# Patient Record
Sex: FEMALE | Race: WHITE | NOT HISPANIC OR LATINO | Employment: UNEMPLOYED | URBAN - NONMETROPOLITAN AREA
[De-identification: names, ages, dates, MRNs, and addresses within clinical notes are randomized per-mention and may not be internally consistent; named-entity substitution may affect disease eponyms.]

---

## 2020-12-24 LAB
BACTERIA SPEC AEROBE CULT: NO GROWTH
EXTERNAL ABO GROUPING: NORMAL
EXTERNAL ANTIBODY SCREEN: NORMAL
EXTERNAL HEMATOCRIT: 36 %
EXTERNAL HEMOGLOBIN: 12.2 G/DL
EXTERNAL HEPATITIS B SURFACE ANTIGEN: NEGATIVE
EXTERNAL PLATELET COUNT: 213 K/ΜL
EXTERNAL RH FACTOR: POSITIVE
EXTERNAL SYPHILIS RPR SCREEN: NORMAL
EXTERNAL THYROID STIMULATING HORMONE: 0.83 M[IU]/ML
HCV AB S/CO SERPL IA: NEGATIVE
HIV 1+2 AB+HIV1 P24 AG SERPL QL IA: NEGATIVE
RUBV IGG SERPL IA-ACNC: NEGATIVE

## 2021-04-09 ENCOUNTER — INITIAL PRENATAL (OUTPATIENT)
Dept: OBSTETRICS AND GYNECOLOGY | Facility: CLINIC | Age: 26
End: 2021-04-09

## 2021-04-09 VITALS — DIASTOLIC BLOOD PRESSURE: 72 MMHG | SYSTOLIC BLOOD PRESSURE: 120 MMHG | WEIGHT: 163 LBS

## 2021-04-09 DIAGNOSIS — Z34.92 PRENATAL CARE IN SECOND TRIMESTER: Primary | ICD-10-CM

## 2021-04-09 DIAGNOSIS — Z36.89 SCREENING, ANTENATAL, FOR FETAL ANATOMIC SURVEY: ICD-10-CM

## 2021-04-09 PROCEDURE — 0501F PRENATAL FLOW SHEET: CPT | Performed by: OBSTETRICS & GYNECOLOGY

## 2021-04-09 RX ORDER — PRENATAL VIT/IRON FUM/FOLIC AC 27MG-0.8MG
TABLET ORAL DAILY
COMMUNITY

## 2021-04-15 NOTE — PROGRESS NOTES
New Pregnancy Visit    Subjective   Chief Complaint   Patient presents with   • Initial Prenatal Visit     ALCIRA, JUSTIN 2021, last pap 2020 WNL. Patient states she is doing well       Veronika Hall is a 25 y.o. year old .  Patient's last menstrual period was 10/20/2020 (lmp unknown).  She presents to initiate prenatal care with our group today.     First pregnancy.   is in the .  She is receiving prenatal care in [place].  Uncomplicated pregnancy thus far.  JUSTIN set by her LMP - 10/20/2020.    Social History    Tobacco Use      Smoking status: Never Smoker      Smokeless tobacco: Never Used      Current Outpatient Medications on File Prior to Visit   Medication Sig Dispense Refill   • Prenatal Vit-Fe Fumarate-FA (prenatal vitamin 27-0.8) 27-0.8 MG tablet tablet Take  by mouth Daily.       No current facility-administered medications on file prior to visit.          Objective   /72   Wt 73.9 kg (163 lb)   LMP 10/20/2020 (LMP Unknown)   Physical Exam:  Normal, gestational age-appropriate exam today        Medical Decision Making:    Lab Review:   All prenatal care records + labs    Note Review:  None    Imaging Review:  Pelvic ultrasound report   IUP at 24+3 weeks. Anatomy was reviewed today and normal except for borderline bilateral urinary tract dilation measuring 4 mm. EFW 724g. Cephalic presentation. Placenta is posterior and low-lying measuring 1 cm from the internal cervical os. Amniotic fluid and fetal heart rate are normal.  Assessment   1. IUP at 24+3 weeks  2. Supervision of low risk pregnancy   3. Transfer of care     Plan    1. The problem list for pregnancy was initiated today  2. Tests/Orders/Rx for today:  Orders Placed This Encounter   Procedures   • Urine Culture - Urine,     This is an external result entered through the Results Console.     Order Specific Question:   Release to patient     Answer:   Immediate   • Chlamydia trachomatis, Neisseria gonorrhoeae, PCR w/  confirmation - Swab, Vagina     Order Specific Question:   Release to patient     Answer:   Immediate   • US Ob 14 + Weeks Single or First Gestation     Order Specific Question:   Reason for Exam:     Answer:   anatomy   • HIV-1 / O / 2 Ag / Antibody 4th Generation     This is an external result entered through the Results Console.     Order Specific Question:   Release to patient     Answer:   Immediate   • Obstetric Panel     This is an external result entered through the Results Console.     Order Specific Question:   Release to patient     Answer:   Immediate   • Hepatitis C Antibody     This is an external result entered through the Results Console.     Order Specific Question:   Release to patient     Answer:   Immediate   • TSH     This is an external result entered through the Results Console.     Order Specific Question:   Release to patient     Answer:   Immediate   • OB Panel With HIV     Order Specific Question:   Release to patient     Answer:   Immediate       Medication Management: none    3. Testing for GC / Chlamydia / trichomonas was recently done and will not need to be repeated  4. Genetic testing: none  5. Information reviewed: exercise in pregnancy, nutrition in pregnancy, weight gain in pregnancy, work and travel restrictions during pregnancy, list of OTC medications acceptable in pregnancy and call coverage groups    Follow up: 2 week(s) for Glucola testing.    Gabriel Flores MD  Obstetrics and Gynecology  [facility]

## 2021-04-26 ENCOUNTER — ROUTINE PRENATAL (OUTPATIENT)
Dept: OBSTETRICS AND GYNECOLOGY | Facility: CLINIC | Age: 26
End: 2021-04-26

## 2021-04-26 VITALS — WEIGHT: 170 LBS | SYSTOLIC BLOOD PRESSURE: 122 MMHG | DIASTOLIC BLOOD PRESSURE: 72 MMHG

## 2021-04-26 DIAGNOSIS — Z3A.26 26 WEEKS GESTATION OF PREGNANCY: Primary | ICD-10-CM

## 2021-04-26 LAB — GLUCOSE 1H P 50 G GLC PO SERPL-MCNC: 78 MG/DL (ref 65–139)

## 2021-04-26 PROCEDURE — 0502F SUBSEQUENT PRENATAL CARE: CPT | Performed by: NURSE PRACTITIONER

## 2021-04-26 NOTE — PROGRESS NOTES
[id number]  Chief Complaint   Patient presents with   • Routine Prenatal Visit     Patient c/o lower back pain        HPI  Veronika is a  currently at 26w6d who today reports the following:   Hx of hip and low back pain was seeing chiropractor   Good FM         EXAM  /72   Wt 77.1 kg (170 lb)   LMP 10/20/2020 (Exact Date)  -See Prenatal Assessment  General Appearance:  Pleasant  Lungs: Breathing unlabored  Abdomen:  See flow sheet for Fundal ht, FM, FHT's  LE: Neg edema  V/E: Not performed     Social History     Tobacco Use   • Smoking status: Never Smoker   • Smokeless tobacco: Never Used   Vaping Use   • Vaping Use: Never used   Substance Use Topics   • Alcohol use: Never   • Drug use: Never         Lab Results   Component Value Date    ABO A 2020    RH Positive 2020    ABSCRN Normal 2020       MDM  Impression: Supervision of normal pregnancy   Low back / hip discomfort    Tests done today: 1 hr. glucola & CBC   Topics discussed: S/S labor and adeq FM/Kick Counts  Comfort measures /Maternity girdle    Nutrition reviewed   s/s PTL  encouraged questions - call prn   Written info optional/provided:  -COVID vaccine in pregnancy  -T-DAP   Tests next visit: none

## 2021-04-27 LAB
ABO GROUP BLD: ABNORMAL
BASOPHILS # BLD AUTO: 0 X10E3/UL (ref 0–0.2)
BASOPHILS NFR BLD AUTO: 0 %
BLD GP AB SCN SERPL QL: NEGATIVE
EOSINOPHIL # BLD AUTO: 0.1 X10E3/UL (ref 0–0.4)
EOSINOPHIL NFR BLD AUTO: 1 %
ERYTHROCYTE [DISTWIDTH] IN BLOOD BY AUTOMATED COUNT: 12.2 % (ref 11.7–15.4)
HBV SURFACE AG SERPL QL IA: NEGATIVE
HCT VFR BLD AUTO: 30.6 % (ref 34–46.6)
HCV AB S/CO SERPL IA: <0.1 S/CO RATIO (ref 0–0.9)
HGB BLD-MCNC: 10.2 G/DL (ref 11.1–15.9)
HIV 1+2 AB+HIV1 P24 AG SERPL QL IA: NON REACTIVE
IMM GRANULOCYTES # BLD AUTO: 0 X10E3/UL (ref 0–0.1)
IMM GRANULOCYTES NFR BLD AUTO: 1 %
LYMPHOCYTES # BLD AUTO: 1.3 X10E3/UL (ref 0.7–3.1)
LYMPHOCYTES NFR BLD AUTO: 25 %
MCH RBC QN AUTO: 31 PG (ref 26.6–33)
MCHC RBC AUTO-ENTMCNC: 33.3 G/DL (ref 31.5–35.7)
MCV RBC AUTO: 93 FL (ref 79–97)
MONOCYTES # BLD AUTO: 0.4 X10E3/UL (ref 0.1–0.9)
MONOCYTES NFR BLD AUTO: 7 %
NEUTROPHILS # BLD AUTO: 3.5 X10E3/UL (ref 1.4–7)
NEUTROPHILS NFR BLD AUTO: 66 %
PLATELET # BLD AUTO: 196 X10E3/UL (ref 150–450)
RBC # BLD AUTO: 3.29 X10E6/UL (ref 3.77–5.28)
RH BLD: POSITIVE
RPR SER QL: NON REACTIVE
RUBV IGG SERPL IA-ACNC: <0.9 INDEX
WBC # BLD AUTO: 5.3 X10E3/UL (ref 3.4–10.8)

## 2021-04-27 RX ORDER — FERROUS SULFATE 325(65) MG
325 TABLET ORAL
Qty: 60 TABLET | Refills: 2 | Status: SHIPPED | OUTPATIENT
Start: 2021-04-27 | End: 2021-04-28 | Stop reason: SDUPTHER

## 2021-04-28 RX ORDER — FERROUS SULFATE 325(65) MG
325 TABLET ORAL
Qty: 60 TABLET | Refills: 2 | Status: SHIPPED | OUTPATIENT
Start: 2021-04-28 | End: 2021-09-10

## 2021-05-10 ENCOUNTER — TELEPHONE (OUTPATIENT)
Dept: OBSTETRICS AND GYNECOLOGY | Facility: CLINIC | Age: 26
End: 2021-05-10

## 2021-05-10 DIAGNOSIS — F32.A DEPRESSION AFFECTING PREGNANCY: Primary | ICD-10-CM

## 2021-05-10 DIAGNOSIS — O99.340 DEPRESSION AFFECTING PREGNANCY: Primary | ICD-10-CM

## 2021-05-10 NOTE — TELEPHONE ENCOUNTER
"----- Message from Shyanne Tsai sent at 5/10/2021 10:33 AM EDT -----  Pt said she had been on Zoloft before getting pregnant. She had stopped it before conceiving but she said she is having some \"pretty bad episodes of anxiety/depression.     She is asking if she could be prescribed Zoloft again.    RX: [facility]    "

## 2021-05-14 ENCOUNTER — ROUTINE PRENATAL (OUTPATIENT)
Dept: OBSTETRICS AND GYNECOLOGY | Facility: CLINIC | Age: 26
End: 2021-05-14

## 2021-05-14 VITALS — SYSTOLIC BLOOD PRESSURE: 104 MMHG | WEIGHT: 168.2 LBS | DIASTOLIC BLOOD PRESSURE: 64 MMHG

## 2021-05-14 DIAGNOSIS — Z34.93 NORMAL PREGNANCY, THIRD TRIMESTER: Primary | ICD-10-CM

## 2021-05-14 PROCEDURE — 0502F SUBSEQUENT PRENATAL CARE: CPT | Performed by: NURSE PRACTITIONER

## 2021-05-14 RX ORDER — NITROFURANTOIN 25; 75 MG/1; MG/1
100 CAPSULE ORAL 2 TIMES DAILY
COMMUNITY
Start: 2021-05-09 | End: 2021-06-21

## 2021-05-14 NOTE — PROGRESS NOTES
[id number]  Chief Complaint   Patient presents with   • Routine Prenatal Visit     Patient is here for routine prenatal visit. She states that she is doing better on zoloft        HPI  Veronika is a  currently at 29w3d who today reports the following:    Good FM   Voiced concerns re: zoloft - though feels she needs it and it has helped   Taking iron BID   Finishing macrobid for UTI  Appetite is fine - has been very active / excise/ walking           EXAM  /64   Wt 76.3 kg (168 lb 3.2 oz)   LMP 10/20/2020 (Exact Date)  -See Prenatal Assessment  General Appearance:  Pleasant  Lungs: Breathing unlabored  Abdomen:  See flow sheet for Fundal ht, FM, FHT's  LE: Neg edema  V/E: Not performed     Social History     Tobacco Use   • Smoking status: Never Smoker   • Smokeless tobacco: Never Used   Vaping Use   • Vaping Use: Never used   Substance Use Topics   • Alcohol use: Never   • Drug use: Never         Lab Results   Component Value Date    ABO A 2021    RH Positive 2021    ABSCRN Negative 2021       MDM  Impression: Supervision of normal pregnancy   Resolving UTI  Panic attacks, anxiety, depression  anemia   Tests done today: none   Topics discussed: continue to note good FM  Finish antibiotics  Reassurance re: zoloft / Benefits vs Risks    Anemia / iron / foods high in iron  Nutrition reviewed   CB classes  encouraged questions - call prn    Tests next visit: none

## 2021-06-07 ENCOUNTER — ROUTINE PRENATAL (OUTPATIENT)
Dept: OBSTETRICS AND GYNECOLOGY | Facility: CLINIC | Age: 26
End: 2021-06-07

## 2021-06-07 VITALS — SYSTOLIC BLOOD PRESSURE: 108 MMHG | DIASTOLIC BLOOD PRESSURE: 64 MMHG | WEIGHT: 173 LBS

## 2021-06-07 DIAGNOSIS — Z36.89 ENCOUNTER FOR ULTRASOUND TO ASSESS FETAL GROWTH: Primary | ICD-10-CM

## 2021-06-07 PROCEDURE — 0502F SUBSEQUENT PRENATAL CARE: CPT | Performed by: OBSTETRICS & GYNECOLOGY

## 2021-06-07 NOTE — PROGRESS NOTES
Chief Complaint   Patient presents with   • Routine Prenatal Visit     has concerns about low placenta         HPI:   , 32w6d gestation reports doing well    ROS:  See Prenatal Episode/Flowsheet  /64   Wt 78.5 kg (173 lb)   LMP 10/20/2020 (Exact Date)      EXAM:  EXTREMITIES:  No swelling-See Prenatal Episode/Flowsheet    ABDOMEN:  FHTs/Movement noted-See Prenatal Episode/Flowsheet    URINE GLUCOSE/PROTEIN:  See Prenatal Episode/Flowsheet    PELVIC EXAM:  See Prenatal Episode/Flowsheet  CV:  Lungs:  GYN:    MDM:    Lab Results   Component Value Date    HGB 10.2 (L) 2021    RUBELLAABIGG <0.90 (L) 2021    HEPBSAG Negative 2021    ABO A 2021    RH Positive 2021    ABSCRN Negative 2021    [id number] Non Reactive 2021    HEPCVIRUSABY <0.1 2021    URINECX No growth 2020       U/S: Overall growth is 48.6 percentile.  Symmetric.  LUBA 16.34.  Vertex.  Posterior placenta.  Active fetus    1. IUP 32w6d  2. Routine care   3.  Anemia of pregnancy: Patient is taking her vitamins and iron.

## 2021-06-19 ENCOUNTER — HOSPITAL ENCOUNTER (OUTPATIENT)
Facility: HOSPITAL | Age: 26
End: 2021-06-19
Attending: OBSTETRICS & GYNECOLOGY | Admitting: OBSTETRICS & GYNECOLOGY

## 2021-06-19 ENCOUNTER — HOSPITAL ENCOUNTER (OUTPATIENT)
Facility: HOSPITAL | Age: 26
Discharge: HOME | End: 2021-06-19
Attending: OBSTETRICS & GYNECOLOGY | Admitting: OBSTETRICS & GYNECOLOGY

## 2021-06-19 VITALS
WEIGHT: 176.7 LBS | RESPIRATION RATE: 18 BRPM | DIASTOLIC BLOOD PRESSURE: 56 MMHG | OXYGEN SATURATION: 100 % | BODY MASS INDEX: 26.17 KG/M2 | SYSTOLIC BLOOD PRESSURE: 120 MMHG | HEIGHT: 69 IN | TEMPERATURE: 97.6 F | HEART RATE: 79 BPM

## 2021-06-19 LAB

## 2021-06-19 PROCEDURE — 81001 URINALYSIS AUTO W/SCOPE: CPT | Performed by: OBSTETRICS & GYNECOLOGY

## 2021-06-19 PROCEDURE — 59025 FETAL NON-STRESS TEST: CPT

## 2021-06-19 PROCEDURE — G0463 HOSPITAL OUTPT CLINIC VISIT: HCPCS

## 2021-06-19 PROCEDURE — 59025 FETAL NON-STRESS TEST: CPT | Performed by: OBSTETRICS & GYNECOLOGY

## 2021-06-19 PROCEDURE — 87086 URINE CULTURE/COLONY COUNT: CPT | Performed by: OBSTETRICS & GYNECOLOGY

## 2021-06-19 RX ORDER — CEPHALEXIN 500 MG/1
500 CAPSULE ORAL 4 TIMES DAILY
Qty: 28 CAPSULE | Refills: 0 | Status: SHIPPED | OUTPATIENT
Start: 2021-06-19 | End: 2021-06-21 | Stop reason: SDUPTHER

## 2021-06-19 RX ORDER — PHENAZOPYRIDINE HYDROCHLORIDE 100 MG/1
100 TABLET, FILM COATED ORAL 3 TIMES DAILY PRN
COMMUNITY
End: 2021-07-22

## 2021-06-19 RX ORDER — CEFPODOXIME PROXETIL 200 MG/1
200 TABLET, FILM COATED ORAL EVERY 12 HOURS
Status: ON HOLD | COMMUNITY
End: 2021-06-19

## 2021-06-19 NOTE — SIGNIFICANT NOTE
06/19/21 1352   Provider Notification   Reason for Communication Evaluate   Provider Name Dr. Dee Rodriguez   Notification Route Phone call   Response See orders  (d/c)

## 2021-06-19 NOTE — NON STRESS TEST
Triage Note - Nursing Documentation  Labor and Delivery Admission Log    Veronika Hall  : 1995  MRN: [id number]  CSN: [id number]    Date in / Time in:  2021  Time in: 1300    Date out / Time out:    Time out: 1413    Nurse: Monique Khan RN    Patient Info: She is a 25 y.o. year old  at 34w4d with an JUSTIN of 2021, by Last Menstrual Period who was seen on the [facility].    Chief Complaint:   Chief Complaint   Patient presents with   • Dysuria     HAS HAD ONGOING UTI SINCE        Provider Instructions / Disposition: Patient discharged home with take home fetal kick count and Pregnancy UTI S&S papers and instructions to  new antibiotic medications at pharmacy, drink plenty of water, and to keep follow up in office for Monday.    There is no problem list on file for this patient.      NST Documentation (Only applicable > 32 weeks): Interpretation A  Nonstress Test Interpretation A: Reactive (21 1330 : Monique Khan, RN)

## 2021-06-20 LAB — BACTERIA SPEC AEROBE CULT: NO GROWTH

## 2021-06-20 NOTE — NON STRESS TEST
Non Stress Test    [facility]    Patient: Veronika Hall  : 1995  MRN: [id number]  CSN: [id number]    Gestational Age: 34w5d    Indication for NST Pain with urination       Time On 13:08   Time Off 13:56       Interpretation    Baseline 's beats per minute   Category 1   Decelerations Absent       Additional Comments See nursing notes       Recommendations for f/u See nursing notes       This note has been electronically signed.    Dee Rodriguez M.D.

## 2021-06-21 ENCOUNTER — ROUTINE PRENATAL (OUTPATIENT)
Dept: OBSTETRICS AND GYNECOLOGY | Facility: CLINIC | Age: 26
End: 2021-06-21

## 2021-06-21 VITALS — WEIGHT: 178 LBS | BODY MASS INDEX: 26.29 KG/M2 | SYSTOLIC BLOOD PRESSURE: 108 MMHG | DIASTOLIC BLOOD PRESSURE: 60 MMHG

## 2021-06-21 DIAGNOSIS — I34.1 MITRAL VALVE PROLAPSE: ICD-10-CM

## 2021-06-21 DIAGNOSIS — Z34.93 PRENATAL CARE IN THIRD TRIMESTER: Primary | ICD-10-CM

## 2021-06-21 DIAGNOSIS — R00.2 PALPITATIONS: ICD-10-CM

## 2021-06-21 DIAGNOSIS — R06.00 DYSPNEA, UNSPECIFIED TYPE: ICD-10-CM

## 2021-06-21 PROCEDURE — 0502F SUBSEQUENT PRENATAL CARE: CPT | Performed by: OBSTETRICS & GYNECOLOGY

## 2021-06-21 RX ORDER — CEPHALEXIN 500 MG/1
500 CAPSULE ORAL NIGHTLY
Qty: 30 CAPSULE | Refills: 5 | Status: SHIPPED | OUTPATIENT
Start: 2021-06-21 | End: 2021-07-31 | Stop reason: HOSPADM

## 2021-06-21 NOTE — PROGRESS NOTES
Prenatal Care Visit    Subjective   Chief Complaint   Patient presents with   • Routine Prenatal Visit     No complaints       History:   Veronika is a  currently at 34w6d who presents for a prenatal care visit today.    Dyspnea, palpitations, history of MVP, Mother is concerned for Marfan's Syndrome    Social History    Tobacco Use      Smoking status: Never Smoker      Smokeless tobacco: Never Used       Objective   /60   Wt 80.7 kg (178 lb)   LMP 10/20/2020 (Exact Date)   BMI 26.29 kg/m²   Physical Exam:  Normal, gestational age-appropriate exam today        Plan   Medical Decision Making:    I have reviewed the prenatal labs and ultrasound(s) today. I have reviewed the most recent prenatal progress note(s).    Diagnosis: Supervision of high risk pregnancy   Mitral valve prolapse  Dyspnea  Palpitations   Tests/Orders/Rx today: Orders Placed This Encounter   Procedures   • Adult Transthoracic Echo Limited W/ Cont if Necessary Per Protocol     PATIENT IS PREGNANT     Standing Status:   Future     Standing Expiration Date:   2022     Order Specific Question:   Reason for exam?     Answer:   Dyspnea     Order Specific Question:   Reason for exam?     Answer:   Palpitations     Order Specific Question:   Reason for exam?     Answer:   Valvular Function     Order Specific Question:   Release to patient     Answer:   Immediate       Medication Management: None     Topics discussed: Prenatal care milestones  kick counts and fetal movement  PIH precautions   labor signs and symptoms   Echo ordered to assess chest symptoms/cardiac function   Tests next visit: none   Next visit: 1 week(s)     Gabriel Flores MD  Obstetrics and Gynecology  [facility]

## 2021-06-24 ENCOUNTER — HOSPITAL ENCOUNTER (OUTPATIENT)
Dept: CARDIOLOGY | Facility: HOSPITAL | Age: 26
Discharge: HOME | End: 2021-06-24
Admitting: OBSTETRICS & GYNECOLOGY

## 2021-06-24 DIAGNOSIS — R06.00 DYSPNEA, UNSPECIFIED TYPE: ICD-10-CM

## 2021-06-24 DIAGNOSIS — R00.2 PALPITATIONS: ICD-10-CM

## 2021-06-24 DIAGNOSIS — I34.1 MITRAL VALVE PROLAPSE: ICD-10-CM

## 2021-06-24 PROCEDURE — 93306 TTE W/DOPPLER COMPLETE: CPT | Performed by: INTERNAL MEDICINE

## 2021-06-24 PROCEDURE — 93306 TTE W/DOPPLER COMPLETE: CPT

## 2021-06-26 LAB
BH CV ECHO MEAS - % IVS THICK: 23.8 %
BH CV ECHO MEAS - % LVPW THICK: 71.6 %
BH CV ECHO MEAS - AO MAX PG (FULL): 3.4 MMHG
BH CV ECHO MEAS - AO MAX PG: 7 MMHG
BH CV ECHO MEAS - AO MEAN PG (FULL): 3 MMHG
BH CV ECHO MEAS - AO MEAN PG: 5 MMHG
BH CV ECHO MEAS - AO ROOT AREA (BSA CORRECTED): 1.6
BH CV ECHO MEAS - AO ROOT AREA: 7.3 CM^2
BH CV ECHO MEAS - AO ROOT DIAM: 3.1 CM
BH CV ECHO MEAS - AO V2 MAX: 136 CM/SEC
BH CV ECHO MEAS - AO V2 MEAN: 102 CM/SEC
BH CV ECHO MEAS - AO V2 VTI: 25.6 CM
BH CV ECHO MEAS - ASC AORTA: 3.1 CM
BH CV ECHO MEAS - AVA(I,A): 2.9 CM^2
BH CV ECHO MEAS - AVA(I,D): 2.9 CM^2
BH CV ECHO MEAS - AVA(V,A): 2.6 CM^2
BH CV ECHO MEAS - AVA(V,D): 2.6 CM^2
BH CV ECHO MEAS - BSA(HAYCOCK): 2 M^2
BH CV ECHO MEAS - BSA: 2 M^2
BH CV ECHO MEAS - BZI_BMI: 26.3 KILOGRAMS/M^2
BH CV ECHO MEAS - BZI_METRIC_HEIGHT: 175.3 CM
BH CV ECHO MEAS - BZI_METRIC_WEIGHT: 80.7 KG
BH CV ECHO MEAS - EDV(CUBED): 91.7 ML
BH CV ECHO MEAS - EDV(MOD-SP2): 123 ML
BH CV ECHO MEAS - EDV(MOD-SP4): 129 ML
BH CV ECHO MEAS - EDV(TEICH): 92.9 ML
BH CV ECHO MEAS - EF(CUBED): 67.8 %
BH CV ECHO MEAS - EF(MOD-BP): 58 %
BH CV ECHO MEAS - EF(MOD-SP2): 49.7 %
BH CV ECHO MEAS - EF(MOD-SP4): 49 %
BH CV ECHO MEAS - EF(TEICH): 59.5 %
BH CV ECHO MEAS - ESV(CUBED): 29.5 ML
BH CV ECHO MEAS - ESV(MOD-SP2): 61.9 ML
BH CV ECHO MEAS - ESV(MOD-SP4): 65.8 ML
BH CV ECHO MEAS - ESV(TEICH): 37.6 ML
BH CV ECHO MEAS - FS: 31.5 %
BH CV ECHO MEAS - IVS/LVPW: 1
BH CV ECHO MEAS - IVSD: 0.84 CM
BH CV ECHO MEAS - IVSS: 1 CM
BH CV ECHO MEAS - LA DIMENSION: 2.9 CM
BH CV ECHO MEAS - LA/AO: 0.94
BH CV ECHO MEAS - LAD MAJOR: 4.7 CM
BH CV ECHO MEAS - LAT PEAK E' VEL: 10.3 CM/SEC
BH CV ECHO MEAS - LATERAL E/E' RATIO: 7
BH CV ECHO MEAS - LV DIASTOLIC VOL/BSA (35-75): 65.6 ML/M^2
BH CV ECHO MEAS - LV MASS(C)D: 118.8 GRAMS
BH CV ECHO MEAS - LV MASS(C)DI: 60.4 GRAMS/M^2
BH CV ECHO MEAS - LV MASS(C)S: 116 GRAMS
BH CV ECHO MEAS - LV MASS(C)SI: 59 GRAMS/M^2
BH CV ECHO MEAS - LV MAX PG: 3.6 MMHG
BH CV ECHO MEAS - LV MEAN PG: 2 MMHG
BH CV ECHO MEAS - LV SYSTOLIC VOL/BSA (12-30): 33.5 ML/M^2
BH CV ECHO MEAS - LV V1 MAX: 94.8 CM/SEC
BH CV ECHO MEAS - LV V1 MEAN: 57.1 CM/SEC
BH CV ECHO MEAS - LV V1 VTI: 19.2 CM
BH CV ECHO MEAS - LVIDD: 4.5 CM
BH CV ECHO MEAS - LVIDS: 3.1 CM
BH CV ECHO MEAS - LVLD AP2: 9.3 CM
BH CV ECHO MEAS - LVLD AP4: 9.1 CM
BH CV ECHO MEAS - LVLS AP2: 7.4 CM
BH CV ECHO MEAS - LVLS AP4: 7.9 CM
BH CV ECHO MEAS - LVOT AREA (M): 3.8 CM^2
BH CV ECHO MEAS - LVOT AREA: 3.8 CM^2
BH CV ECHO MEAS - LVOT DIAM: 2.2 CM
BH CV ECHO MEAS - LVPWD: 0.81 CM
BH CV ECHO MEAS - LVPWS: 1.4 CM
BH CV ECHO MEAS - MED PEAK E' VEL: 10.3 CM/SEC
BH CV ECHO MEAS - MEDIAL E/E' RATIO: 7
BH CV ECHO MEAS - MV A MAX VEL: 56.3 CM/SEC
BH CV ECHO MEAS - MV DEC TIME: 0.25 SEC
BH CV ECHO MEAS - MV E MAX VEL: 72.3 CM/SEC
BH CV ECHO MEAS - MV E/A: 1.3
BH CV ECHO MEAS - MV MAX PG: 1.9 MMHG
BH CV ECHO MEAS - MV MEAN PG: 1 MMHG
BH CV ECHO MEAS - MV V2 MAX: 68.9 CM/SEC
BH CV ECHO MEAS - MV V2 MEAN: 46 CM/SEC
BH CV ECHO MEAS - MV V2 VTI: 17.6 CM
BH CV ECHO MEAS - MVA(VTI): 4.1 CM^2
BH CV ECHO MEAS - PA ACC TIME: 0.08 SEC
BH CV ECHO MEAS - PA MAX PG (FULL): 3.9 MMHG
BH CV ECHO MEAS - PA MAX PG: 7.2 MMHG
BH CV ECHO MEAS - PA MEAN PG (FULL): 1 MMHG
BH CV ECHO MEAS - PA MEAN PG: 3 MMHG
BH CV ECHO MEAS - PA PR(ACCEL): 42.6 MMHG
BH CV ECHO MEAS - PA V2 MAX: 134 CM/SEC
BH CV ECHO MEAS - PA V2 MEAN: 82.3 CM/SEC
BH CV ECHO MEAS - PA V2 VTI: 21 CM
BH CV ECHO MEAS - PI END-D VEL: 103 CM/SEC
BH CV ECHO MEAS - RV MAX PG: 3.3 MMHG
BH CV ECHO MEAS - RV MEAN PG: 2 MMHG
BH CV ECHO MEAS - RV V1 MAX: 90.2 CM/SEC
BH CV ECHO MEAS - RV V1 MEAN: 60.4 CM/SEC
BH CV ECHO MEAS - RV V1 VTI: 16.1 CM
BH CV ECHO MEAS - SI(AO): 95.1 ML/M^2
BH CV ECHO MEAS - SI(CUBED): 31.7 ML/M^2
BH CV ECHO MEAS - SI(LVOT): 37.1 ML/M^2
BH CV ECHO MEAS - SI(MOD-SP2): 31.1 ML/M^2
BH CV ECHO MEAS - SI(MOD-SP4): 32.1 ML/M^2
BH CV ECHO MEAS - SI(TEICH): 28.1 ML/M^2
BH CV ECHO MEAS - SV(AO): 187 ML
BH CV ECHO MEAS - SV(CUBED): 62.2 ML
BH CV ECHO MEAS - SV(LVOT): 73 ML
BH CV ECHO MEAS - SV(MOD-SP2): 61.1 ML
BH CV ECHO MEAS - SV(MOD-SP4): 63.2 ML
BH CV ECHO MEAS - SV(TEICH): 55.3 ML
BH CV ECHO MEAS - TAPSE (>1.6): 3.4 CM
BH CV ECHO MEASUREMENTS AVERAGE E/E' RATIO: 7.02
BH CV XLRA - RV BASE: 4.7 CM
BH CV XLRA - RV LENGTH: 8.6 CM
BH CV XLRA - RV MID: 4.3 CM
BH CV XLRA - TDI S': 20.4 CM/SEC
LEFT ATRIUM VOLUME INDEX: 18 ML/M^2
LEFT ATRIUM VOLUME: 35.4 ML
LV EF 2D ECHO EST: 58 %
MAXIMAL PREDICTED HEART RATE: 194 BPM
STRESS TARGET HR: 165 BPM

## 2021-06-28 ENCOUNTER — ROUTINE PRENATAL (OUTPATIENT)
Dept: OBSTETRICS AND GYNECOLOGY | Facility: CLINIC | Age: 26
End: 2021-06-28

## 2021-06-28 VITALS — WEIGHT: 179 LBS | BODY MASS INDEX: 26.43 KG/M2 | SYSTOLIC BLOOD PRESSURE: 126 MMHG | DIASTOLIC BLOOD PRESSURE: 70 MMHG

## 2021-06-28 DIAGNOSIS — F32.A DEPRESSION AFFECTING PREGNANCY: ICD-10-CM

## 2021-06-28 DIAGNOSIS — R00.2 PALPITATIONS: ICD-10-CM

## 2021-06-28 DIAGNOSIS — O99.340 DEPRESSION AFFECTING PREGNANCY: ICD-10-CM

## 2021-06-28 DIAGNOSIS — R06.00 DYSPNEA, UNSPECIFIED TYPE: ICD-10-CM

## 2021-06-28 DIAGNOSIS — Z34.03 ENCOUNTER FOR SUPERVISION OF NORMAL FIRST PREGNANCY IN THIRD TRIMESTER: Primary | ICD-10-CM

## 2021-06-28 PROCEDURE — 0502F SUBSEQUENT PRENATAL CARE: CPT | Performed by: OBSTETRICS & GYNECOLOGY

## 2021-06-28 NOTE — PROGRESS NOTES
Chief Complaint  Routine Prenatal Visit (No complaints)    History of Present Illness:  Veronika is a  currently at 35w6d who presents today with no complaints.  Patient does report good fetal movement.  Patient did have echocardiogram as noted.  Patient reports that history of mitral valve prolapse.  Patient has been having dyspnea and palpitations.    Exam:  Vitals:  See prenatal flowsheet as noted and reviewed  General: Alert, cooperative, and does not appear in any distress  Abdomen:   See prenatal flowsheet as noted and reviewed    Uterus gravid, non-tender; no palpable masses    No guarding or rebound tenderness  Pelvic:  See prenatal flowsheet as noted and reviewed  Ext:  See prenatal flowsheet as noted and reviewed    Moves extremities well, no cyanosis and no redness  Urine:  See prenatal flowsheet as noted and reviewed    Data Review:  The following data was reviewed by: Dee Rodriguez MD on 2021:  Prenatal Labs:  Lab Results   Component Value Date    HGB 10.2 (L) 2021    RUBELLAABIGG <0.90 (L) 2021    HEPBSAG Negative 2021    ABO A 2021    RH Positive 2021    ABSCRN Negative 2021    OGP3JYH6 Non Reactive 2021    HEPCVIRUSABY <0.1 2021    URINECX No growth 2021       Hospital Outpatient Visit on 2021   Component Date Value   • BSA 2021 2.0    • IVSd 2021 0.84    • IVSs 2021 1.0    • LVIDd 2021 4.5    • LVIDs 2021 3.1    • LVPWd 2021 0.81    • BH CV ECHO MELISSA - LVPWS 2021 1.4    • IVS/LVPW 2021 1.0    • FS 2021 31.5    • EDV(Teich) 2021 92.9    • ESV(Teich) 2021 37.6    • EF(Teich) 2021 59.5    • EDV(cubed) 2021 91.7    • ESV(cubed) 2021 29.5    • EF(cubed) 2021 67.8    • % IVS thick 2021 23.8    • % LVPW thick 2021 71.6    • LV mass(C)d 2021 118.8    • LV mass(C)dI 2021 60.4    • LV mass(C)s 2021 116.0    • LV mass(C)sI  06/24/2021 59.0    • SV(Teich) 06/24/2021 55.3    • SI(Teich) 06/24/2021 28.1    • SV(cubed) 06/24/2021 62.2    • SI(cubed) 06/24/2021 31.7    • Ao root diam 06/24/2021 3.1    • Ao root area 06/24/2021 7.3    • LA dimension 06/24/2021 2.9    • asc Aorta Diam 06/24/2021 3.1    • LA/Ao 06/24/2021 0.94    • LVOT diam 06/24/2021 2.2    • LVOT area 06/24/2021 3.8    • LVOT area(traced) 06/24/2021 3.8    • LAd major 06/24/2021 4.7    • LVLd ap4 06/24/2021 9.1    • EDV(MOD-sp4) 06/24/2021 129.0    • LVLs ap4 06/24/2021 7.9    • ESV(MOD-sp4) 06/24/2021 65.8    • EF(MOD-sp4) 06/24/2021 49.0    • LVLd ap2 06/24/2021 9.3    • EDV(MOD-sp2) 06/24/2021 123.0    • LVLs ap2 06/24/2021 7.4    • ESV(MOD-sp2) 06/24/2021 61.9    • EF(MOD-sp2) 06/24/2021 49.7    • LA volume 06/24/2021 35.4    • EF(MOD-bp) 06/24/2021 58    • SV(MOD-sp4) 06/24/2021 63.2    • SI(MOD-sp4) 06/24/2021 32.1    • SV(MOD-sp2) 06/24/2021 61.1    • SI(MOD-sp2) 06/24/2021 31.1    • Ao root area (BSA correc* 06/24/2021 1.6    • LV Hatch Vol (BSA correct* 06/24/2021 65.6    • LV Sys Vol (BSA correcte* 06/24/2021 33.5    • TAPSE (>1.6) 06/24/2021 3.4    • LA Volume Index 06/24/2021 18.0    • MV E max delmer 06/24/2021 72.3    • MV A max delmer 06/24/2021 56.3    • MV E/A 06/24/2021 1.3    • MV V2 max 06/24/2021 68.9    • MV max PG 06/24/2021 1.9    • MV V2 mean 06/24/2021 46.0    • MV mean PG 06/24/2021 1.0    • MV V2 VTI 06/24/2021 17.6    • MVA(VTI) 06/24/2021 4.1    • MV dec time 06/24/2021 0.25    • Ao pk delmer 06/24/2021 136.0    • Ao max PG 06/24/2021 7.0    • Ao max PG (full) 06/24/2021 3.4    • Ao V2 mean 06/24/2021 102.0    • Ao mean PG 06/24/2021 5.0    • Ao mean PG (full) 06/24/2021 3.0    • Ao V2 VTI 06/24/2021 25.6    • DANUTA(I,A) 06/24/2021 2.9    • DANUTA(I,D) 06/24/2021 2.9    • DANUTA(V,A) 06/24/2021 2.6    • DANUTA(V,D) 06/24/2021 2.6    • LV V1 max PG 06/24/2021 3.6    • LV V1 mean PG 06/24/2021 2.0    • LV V1 max 06/24/2021 94.8    • LV V1 mean 06/24/2021 57.1    • LV  V1 VTI 06/24/2021 19.2    • SV(Ao) 06/24/2021 187.0    • SI(Ao) 06/24/2021 95.1    • SV(LVOT) 06/24/2021 73.0    • SI(LVOT) 06/24/2021 37.1    • PA V2 max 06/24/2021 134.0    • PA max PG 06/24/2021 7.2    • PA max PG (full) 06/24/2021 3.9    • PA V2 mean 06/24/2021 82.3    • PA mean PG 06/24/2021 3.0    • PA mean PG (full) 06/24/2021 1.0    • PA V2 VTI 06/24/2021 21.0    • PA acc time 06/24/2021 0.08    • PI end-d anselmo 06/24/2021 103.0    • RV V1 max PG 06/24/2021 3.3    • RV V1 mean PG 06/24/2021 2.0    • RV V1 max 06/24/2021 90.2    • RV V1 mean 06/24/2021 60.4    • RV V1 VTI 06/24/2021 16.1    • PA pr(Accel) 06/24/2021 42.6    • RV Base 06/24/2021 4.7    • RV Length 06/24/2021 8.6    • RV Mid 06/24/2021 4.3    • RV S' 06/24/2021 20.4    • Lat E/e'  06/24/2021 7.0    • Med E/e' 06/24/2021 7.0    • Lat Peak E' Anselmo 06/24/2021 10.3    • Med Peak E' Anselmo 06/24/2021 10.3    •  CV ECHO MELISSA - BZI_BMI 06/24/2021 26.3    •  CV ECHO MELISSA - BSA(HA* 06/24/2021 2.0    •  CV ECHO MELISSA - BZI_ME* 06/24/2021 80.7    •  CV ECHO MELISSA - BZI_ME* 06/24/2021 175.3    • Avg E/e' ratio 06/24/2021 7.02    • Target HR (85%) 06/24/2021 165    • Max. Pred. HR (100%) 06/24/2021 194    • Echo EF Estimated 06/24/2021 58    Admission on 06/19/2021, Discharged on 06/19/2021   Component Date Value   • Color, UA 06/19/2021 Dark Yellow*   • Appearance, UA 06/19/2021 Clear    • pH, UA 06/19/2021 6.5    • Specific Gravity, UA 06/19/2021 1.009    • Glucose, UA 06/19/2021 Negative    • Ketones, UA 06/19/2021 Negative    • Bilirubin, UA 06/19/2021 Negative    • Blood, UA 06/19/2021 Negative    • Protein, UA 06/19/2021 Negative    • Leuk Esterase, UA 06/19/2021 Trace*   • Nitrite, UA 06/19/2021 Positive*   • Urobilinogen, UA 06/19/2021 1.0 E.U./dL    • RBC, UA 06/19/2021 0-2*   • WBC, UA 06/19/2021 13-20*   • Bacteria, UA 06/19/2021 3+*   • Squamous Epithelial Cell* 06/19/2021 0-2    • Hyaline Casts, UA 06/19/2021 None Seen    • Methodology  2021 Manual Light Microscopy    • Urine Culture 2021 No growth      Imaging:  Adult Transthoracic Echo Complete w/ Color, Spectral and Contrast if Necessary Per Protocol  · Estimated left ventricular EF = 58% Left ventricular ejection fraction   appears to be 56 - 60%. Left ventricular systolic function is normal.  · Left ventricular diastolic function was normal.       Medical Records:  None    Assessment and Plan:  Problem List Items Addressed This Visit     None      Visit Diagnoses     Encounter for supervision of normal first pregnancy in third trimester    -  Primary  Topics discussed:     kick counts and fetal movement  PIH precautions   labor signs and symptoms  GBS next visit    Palpitations      Patient informed regarding her echocardiogram findings.  Instructions and precautions are given.    Dyspnea, unspecified type      Patient has been informed regarding her echocardiogram findings.  Instructions and precautions have been given.    Depression affecting pregnancy            Follow Up/Instructions:    Patient was given instructions and counseling regarding her condition or for health maintenance advice. Please see specific information pulled into the AVS if appropriate.     Note: Speech recognition transcription software may have been used to dictate portions of this document.  An attempt at proofreading has been made though minor errors in transcription may still be present.    This note was electronically signed.  Dee Rodriguez M.D.

## 2021-07-06 ENCOUNTER — ROUTINE PRENATAL (OUTPATIENT)
Dept: OBSTETRICS AND GYNECOLOGY | Facility: CLINIC | Age: 26
End: 2021-07-06

## 2021-07-06 VITALS — DIASTOLIC BLOOD PRESSURE: 76 MMHG | BODY MASS INDEX: 26.29 KG/M2 | SYSTOLIC BLOOD PRESSURE: 124 MMHG | WEIGHT: 178 LBS

## 2021-07-06 DIAGNOSIS — Z34.93 PRENATAL CARE IN THIRD TRIMESTER: Primary | ICD-10-CM

## 2021-07-06 DIAGNOSIS — Z36.85 ANTENATAL SCREENING FOR STREPTOCOCCUS B: ICD-10-CM

## 2021-07-06 PROCEDURE — 59426 ANTEPARTUM CARE ONLY: CPT | Performed by: OBSTETRICS & GYNECOLOGY

## 2021-07-06 NOTE — PROGRESS NOTES
Prenatal Care Visit    Subjective   Chief Complaint   Patient presents with   • Routine Prenatal Visit     GBS done today, no complaints       History:   Veronika is a  currently at 37w0d who presents for a prenatal care visit today.    No issues.    Social History    Tobacco Use      Smoking status: Never Smoker      Smokeless tobacco: Never Used       Objective   /76   Wt 80.7 kg (178 lb)   LMP 10/20/2020 (Exact Date)   BMI 26.29 kg/m²   Physical Exam:  Normal, gestational age-appropriate exam today        Plan   Medical Decision Making:    I have reviewed the prenatal labs and ultrasound(s) today. I have reviewed the most recent prenatal progress note(s).    Diagnosis: Supervision of low risk pregnancy   Palpitations   Tests/Orders/Rx today: Orders Placed This Encounter   Procedures   • Strep Grp B SINAN + Reflex - Swab, Vaginal/Rectum     Order Specific Question:   Release to patient     Answer:   Immediate       Medication Management: None     Topics discussed: Prenatal care milestones  kick counts and fetal movement  labor signs and symptoms  PIH precautions    Tests next visit: none   Next visit: 1 week(s)     Gabriel Flores MD  Obstetrics and Gynecology  [facility]

## 2021-07-08 LAB — GP B STREP DNA SPEC QL NAA+PROBE: NEGATIVE

## 2021-07-12 ENCOUNTER — ROUTINE PRENATAL (OUTPATIENT)
Dept: OBSTETRICS AND GYNECOLOGY | Facility: CLINIC | Age: 26
End: 2021-07-12

## 2021-07-12 VITALS — SYSTOLIC BLOOD PRESSURE: 118 MMHG | DIASTOLIC BLOOD PRESSURE: 76 MMHG | BODY MASS INDEX: 26.43 KG/M2 | WEIGHT: 179 LBS

## 2021-07-12 DIAGNOSIS — Z34.03 ENCOUNTER FOR SUPERVISION OF NORMAL FIRST PREGNANCY IN THIRD TRIMESTER: Primary | ICD-10-CM

## 2021-07-12 PROCEDURE — 0502F SUBSEQUENT PRENATAL CARE: CPT | Performed by: MIDWIFE

## 2021-07-12 NOTE — PROGRESS NOTES
Chief Complaint   Patient presents with   • Routine Prenatal Visit     saturday constant contractions, nothing since       HPI: Veronika is a  currently at 37w6d who today reports the following:  Baby is active. She had a lot of contractions on 7/10 and would like her cervix checked today. She feels like the baby has dropped.                EXAM:     Vitals:    21 1123   BP: 118/76      Abdomen:   See prenatal flowsheet as noted and reviewed, soft, nontender   Pelvic:  Posterior and soft, FT/thick/ -2   Urine:  See prenatal flowsheet as noted and reviewed    Lab Results   Component Value Date    ABO A 2021    RH Positive 2021    ABSCRN Negative 2021       MDM:  Impression: Supervision of normal pregnancy  Anemia   Tests done today: none   Topics discussed: kick counts and fetal movement  labor signs and symptoms   Reviewed OB labs   Tests next visit: none                RTO:                        1 week    This note was electronically signed.  Ksenia Hager, TOM  2021

## 2021-07-19 ENCOUNTER — ROUTINE PRENATAL (OUTPATIENT)
Dept: OBSTETRICS AND GYNECOLOGY | Facility: CLINIC | Age: 26
End: 2021-07-19

## 2021-07-19 VITALS — WEIGHT: 179 LBS | DIASTOLIC BLOOD PRESSURE: 70 MMHG | BODY MASS INDEX: 26.43 KG/M2 | SYSTOLIC BLOOD PRESSURE: 116 MMHG

## 2021-07-19 DIAGNOSIS — Z34.93 NORMAL PREGNANCY, THIRD TRIMESTER: Primary | ICD-10-CM

## 2021-07-19 PROCEDURE — 0502F SUBSEQUENT PRENATAL CARE: CPT | Performed by: NURSE PRACTITIONER

## 2021-07-19 RX ORDER — SERTRALINE HYDROCHLORIDE 25 MG/1
25 TABLET, FILM COATED ORAL DAILY
COMMUNITY
End: 2021-07-31 | Stop reason: HOSPADM

## 2021-07-19 NOTE — PROGRESS NOTES
[id number]  Chief Complaint   Patient presents with   • Routine Prenatal Visit     Patient states she is doing well        HPI  Veronika is a  currently at 38w6d who today reports the following:   Good FM   Considering induction      EXAM  /70   Wt 81.2 kg (179 lb)   LMP 10/20/2020 (Exact Date)   BMI 26.43 kg/m²  -See Prenatal Assessment  General Appearance:  Pleasant  Lungs: Breathing unlabored  Abdomen:  See flow sheet for Fundal ht, FM, FHT's  LE: Neg edema  V/E: cx very post / FT     Social History     Tobacco Use   • Smoking status: Never Smoker   • Smokeless tobacco: Never Used   Vaping Use   • Vaping Use: Never used   Substance Use Topics   • Alcohol use: Never   • Drug use: Never         Lab Results   Component Value Date    ABO A 2021    RH Positive 2021    ABSCRN Negative 2021       MDM  Impression: Supervision of normal pregnancy   Tests done today: none   Topics discussed: S/S labor and adeq FM/Kick Counts  Discussed V/E / B&R of induction / options   Would like to think about induction & risa cx on Wed   encouraged questions - call prn    Tests next visit: none

## 2021-07-22 ENCOUNTER — ROUTINE PRENATAL (OUTPATIENT)
Dept: OBSTETRICS AND GYNECOLOGY | Facility: CLINIC | Age: 26
End: 2021-07-22

## 2021-07-22 VITALS — SYSTOLIC BLOOD PRESSURE: 118 MMHG | DIASTOLIC BLOOD PRESSURE: 80 MMHG | WEIGHT: 180 LBS | BODY MASS INDEX: 26.58 KG/M2

## 2021-07-22 DIAGNOSIS — Z34.03 ENCOUNTER FOR SUPERVISION OF NORMAL FIRST PREGNANCY IN THIRD TRIMESTER: Primary | ICD-10-CM

## 2021-07-22 PROCEDURE — 0502F SUBSEQUENT PRENATAL CARE: CPT | Performed by: MIDWIFE

## 2021-07-22 NOTE — PROGRESS NOTES
Chief Complaint   Patient presents with   • Routine Prenatal Visit     Patient states she is doing well       HPI: Veronika is a  currently at 39w2d who today reports the following:  Baby is active.  She denies any cramping or contractions.  She started her care in [place] and states her initial due date was 2021. She feels like her conception was on 2020 which is consistent with this due date.  She would prefer not to be induced until after her due date and then she would consider.                EXAM:     Vitals:    21 1052   BP: 118/80      Abdomen:   See prenatal flowsheet as noted and reviewed, soft, nontender   Pelvic:  See prenatal flowsheet as noted and reviewed, FT/50/-2 very posterior   Urine:  See prenatal flowsheet as noted and reviewed    Lab Results   Component Value Date    ABO A 2021    RH Positive 2021    ABSCRN Negative 2021       MDM:  Impression: Supervision of low risk pregnancy  Anemia in pregnancy   Tests done today: none   Topics discussed: kick counts and fetal movement  labor signs and symptoms  Reviewed OB labs   Tests next visit: BPP                RTO:                        1 weeks    This note was electronically signed.  Ksenia Hager, APRN  2021

## 2021-07-28 ENCOUNTER — PREP FOR SURGERY (OUTPATIENT)
Dept: OTHER | Facility: HOSPITAL | Age: 26
End: 2021-07-28

## 2021-07-28 ENCOUNTER — HOSPITAL ENCOUNTER (OUTPATIENT)
Facility: HOSPITAL | Age: 26
Discharge: HOME | End: 2021-07-28
Attending: NURSE PRACTITIONER | Admitting: NURSE PRACTITIONER

## 2021-07-28 ENCOUNTER — HOSPITAL ENCOUNTER (INPATIENT)
Facility: HOSPITAL | Age: 26
LOS: 3 days | Discharge: HOME | End: 2021-07-31
Attending: MIDWIFE | Admitting: OBSTETRICS & GYNECOLOGY

## 2021-07-28 ENCOUNTER — ROUTINE PRENATAL (OUTPATIENT)
Dept: OBSTETRICS AND GYNECOLOGY | Facility: CLINIC | Age: 26
End: 2021-07-28

## 2021-07-28 VITALS
WEIGHT: 179.9 LBS | HEART RATE: 65 BPM | SYSTOLIC BLOOD PRESSURE: 113 MMHG | OXYGEN SATURATION: 99 % | DIASTOLIC BLOOD PRESSURE: 61 MMHG | HEIGHT: 69 IN | RESPIRATION RATE: 18 BRPM | BODY MASS INDEX: 26.64 KG/M2 | TEMPERATURE: 98.3 F

## 2021-07-28 VITALS — BODY MASS INDEX: 27.16 KG/M2 | WEIGHT: 184 LBS | SYSTOLIC BLOOD PRESSURE: 122 MMHG | DIASTOLIC BLOOD PRESSURE: 74 MMHG

## 2021-07-28 DIAGNOSIS — O35.EXX0 FETAL HYDRONEPHROSIS DURING PREGNANCY, ANTEPARTUM, SINGLE OR UNSPECIFIED FETUS: ICD-10-CM

## 2021-07-28 DIAGNOSIS — O41.03X0 OLIGOHYDRAMNIOS IN THIRD TRIMESTER, SINGLE OR UNSPECIFIED FETUS: ICD-10-CM

## 2021-07-28 DIAGNOSIS — Z3A.40 40 WEEKS GESTATION OF PREGNANCY: ICD-10-CM

## 2021-07-28 DIAGNOSIS — O36.8130 DECREASED FETAL MOVEMENTS IN THIRD TRIMESTER, SINGLE OR UNSPECIFIED FETUS: ICD-10-CM

## 2021-07-28 DIAGNOSIS — Z34.03 ENCOUNTER FOR SUPERVISION OF NORMAL FIRST PREGNANCY IN THIRD TRIMESTER: Primary | ICD-10-CM

## 2021-07-28 DIAGNOSIS — Z3A.40 40 WEEKS GESTATION OF PREGNANCY: Primary | ICD-10-CM

## 2021-07-28 PROBLEM — Z34.90 PREGNANCY: Status: ACTIVE | Noted: 2021-07-28

## 2021-07-28 LAB
ABO GROUP BLD: NORMAL
ABO GROUP BLD: NORMAL
BASOPHILS # BLD AUTO: 0.02 10*3/MM3 (ref 0–0.2)
BASOPHILS NFR BLD AUTO: 0.3 % (ref 0–1.5)
BILIRUB UR QL STRIP: NEGATIVE
BLD GP AB SCN SERPL QL: NEGATIVE
CLARITY UR: CLEAR
COLOR UR: YELLOW
DEPRECATED RDW RBC AUTO: 40.3 FL (ref 37–54)
EOSINOPHIL # BLD AUTO: 0.04 10*3/MM3 (ref 0–0.4)
EOSINOPHIL NFR BLD AUTO: 0.6 % (ref 0.3–6.2)
ERYTHROCYTE [DISTWIDTH] IN BLOOD BY AUTOMATED COUNT: 12.6 % (ref 12.3–15.4)
GLUCOSE UR STRIP-MCNC: NEGATIVE MG/DL
HCT VFR BLD AUTO: 31.1 % (ref 34–46.6)
HGB BLD-MCNC: 10.8 G/DL (ref 12–15.9)
HGB UR QL STRIP.AUTO: NEGATIVE
IMM GRANULOCYTES # BLD AUTO: 0.03 10*3/MM3 (ref 0–0.05)
IMM GRANULOCYTES NFR BLD AUTO: 0.5 % (ref 0–0.5)
KETONES UR QL STRIP: NEGATIVE
LEUKOCYTE ESTERASE UR QL STRIP.AUTO: NEGATIVE
LYMPHOCYTES # BLD AUTO: 1.47 10*3/MM3 (ref 0.7–3.1)
LYMPHOCYTES NFR BLD AUTO: 22.2 % (ref 19.6–45.3)
MCH RBC QN AUTO: 30.9 PG (ref 26.6–33)
MCHC RBC AUTO-ENTMCNC: 34.7 G/DL (ref 31.5–35.7)
MCV RBC AUTO: 88.9 FL (ref 79–97)
MONOCYTES # BLD AUTO: 0.48 10*3/MM3 (ref 0.1–0.9)
MONOCYTES NFR BLD AUTO: 7.2 % (ref 5–12)
NEUTROPHILS NFR BLD AUTO: 4.59 10*3/MM3 (ref 1.7–7)
NEUTROPHILS NFR BLD AUTO: 69.2 % (ref 42.7–76)
NITRITE UR QL STRIP: NEGATIVE
NRBC BLD AUTO-RTO: 0 /100 WBC (ref 0–0.2)
PH UR STRIP.AUTO: 7 [PH] (ref 5–8)
PLATELET # BLD AUTO: 153 10*3/MM3 (ref 140–450)
PMV BLD AUTO: 10 FL (ref 6–12)
PROT UR QL STRIP: NEGATIVE
RBC # BLD AUTO: 3.5 10*6/MM3 (ref 3.77–5.28)
RH BLD: POSITIVE
RH BLD: POSITIVE
SARS-COV-2 RNA PNL SPEC NAA+PROBE: NOT DETECTED
SP GR UR STRIP: 1.01 (ref 1–1.03)
T&S EXPIRATION DATE: NORMAL
UROBILINOGEN UR QL STRIP: NORMAL
WBC # BLD AUTO: 6.63 10*3/MM3 (ref 3.4–10.8)

## 2021-07-28 PROCEDURE — 3E033VJ INTRODUCTION OF OTHER HORMONE INTO PERIPHERAL VEIN, PERCUTANEOUS APPROACH: ICD-10-PCS | Performed by: OBSTETRICS & GYNECOLOGY

## 2021-07-28 PROCEDURE — 86901 BLOOD TYPING SEROLOGIC RH(D): CPT

## 2021-07-28 PROCEDURE — 59025 FETAL NON-STRESS TEST: CPT

## 2021-07-28 PROCEDURE — 84112 EVAL AMNIOTIC FLUID PROTEIN: CPT | Performed by: MIDWIFE

## 2021-07-28 PROCEDURE — 87086 URINE CULTURE/COLONY COUNT: CPT | Performed by: MIDWIFE

## 2021-07-28 PROCEDURE — 0502F SUBSEQUENT PRENATAL CARE: CPT | Performed by: OBSTETRICS & GYNECOLOGY

## 2021-07-28 PROCEDURE — 81003 URINALYSIS AUTO W/O SCOPE: CPT | Performed by: MIDWIFE

## 2021-07-28 PROCEDURE — S0260 H&P FOR SURGERY: HCPCS | Performed by: NURSE PRACTITIONER

## 2021-07-28 PROCEDURE — 59025 FETAL NON-STRESS TEST: CPT | Performed by: NURSE PRACTITIONER

## 2021-07-28 PROCEDURE — G0463 HOSPITAL OUTPT CLINIC VISIT: HCPCS

## 2021-07-28 PROCEDURE — 86850 RBC ANTIBODY SCREEN: CPT | Performed by: MIDWIFE

## 2021-07-28 PROCEDURE — 86900 BLOOD TYPING SEROLOGIC ABO: CPT | Performed by: MIDWIFE

## 2021-07-28 PROCEDURE — 87635 SARS-COV-2 COVID-19 AMP PRB: CPT | Performed by: MIDWIFE

## 2021-07-28 PROCEDURE — 86900 BLOOD TYPING SEROLOGIC ABO: CPT

## 2021-07-28 PROCEDURE — 85025 COMPLETE CBC W/AUTO DIFF WBC: CPT | Performed by: OBSTETRICS & GYNECOLOGY

## 2021-07-28 PROCEDURE — 86901 BLOOD TYPING SEROLOGIC RH(D): CPT | Performed by: MIDWIFE

## 2021-07-28 RX ORDER — OXYTOCIN/0.9 % SODIUM CHLORIDE 30/500 ML
1-20 PLASTIC BAG, INJECTION (ML) INTRAVENOUS
Status: DISCONTINUED | OUTPATIENT
Start: 2021-07-28 | End: 2021-07-28

## 2021-07-28 RX ORDER — LIDOCAINE HYDROCHLORIDE 10 MG/ML
5 INJECTION, SOLUTION EPIDURAL; INFILTRATION; INTRACAUDAL; PERINEURAL AS NEEDED
Status: DISCONTINUED | OUTPATIENT
Start: 2021-07-28 | End: 2021-07-29

## 2021-07-28 RX ORDER — MAGNESIUM CARB/ALUMINUM HYDROX 105-160MG
30 TABLET,CHEWABLE ORAL ONCE
Status: DISCONTINUED | OUTPATIENT
Start: 2021-07-28 | End: 2021-07-29

## 2021-07-28 RX ORDER — SODIUM CHLORIDE 0.9 % (FLUSH) 0.9 %
3 SYRINGE (ML) INJECTION EVERY 12 HOURS SCHEDULED
Status: DISCONTINUED | OUTPATIENT
Start: 2021-07-28 | End: 2021-07-31 | Stop reason: HOSPADM

## 2021-07-28 RX ORDER — HYDROCODONE BITARTRATE AND ACETAMINOPHEN 5; 325 MG/1; MG/1
2 TABLET ORAL ONCE AS NEEDED
Status: COMPLETED | OUTPATIENT
Start: 2021-07-28 | End: 2021-07-29

## 2021-07-28 RX ORDER — MORPHINE SULFATE 2 MG/ML
2 INJECTION, SOLUTION INTRAMUSCULAR; INTRAVENOUS ONCE AS NEEDED
Status: CANCELLED | OUTPATIENT
Start: 2021-07-28

## 2021-07-28 RX ORDER — METHYLERGONOVINE MALEATE 0.2 MG/ML
200 INJECTION INTRAVENOUS ONCE AS NEEDED
Status: CANCELLED | OUTPATIENT
Start: 2021-07-28

## 2021-07-28 RX ORDER — PROMETHAZINE HYDROCHLORIDE 12.5 MG/1
12.5 SUPPOSITORY RECTAL EVERY 6 HOURS PRN
Status: CANCELLED | OUTPATIENT
Start: 2021-07-28

## 2021-07-28 RX ORDER — ACETAMINOPHEN 325 MG/1
650 TABLET ORAL ONCE AS NEEDED
Status: CANCELLED | OUTPATIENT
Start: 2021-07-28

## 2021-07-28 RX ORDER — MORPHINE SULFATE 4 MG/ML
4 INJECTION, SOLUTION INTRAMUSCULAR; INTRAVENOUS ONCE AS NEEDED
Status: CANCELLED | OUTPATIENT
Start: 2021-07-28

## 2021-07-28 RX ORDER — SODIUM CHLORIDE 0.9 % (FLUSH) 0.9 %
3 SYRINGE (ML) INJECTION EVERY 12 HOURS SCHEDULED
Status: CANCELLED | OUTPATIENT
Start: 2021-07-28

## 2021-07-28 RX ORDER — CARBOPROST TROMETHAMINE 250 UG/ML
250 INJECTION, SOLUTION INTRAMUSCULAR AS NEEDED
Status: DISCONTINUED | OUTPATIENT
Start: 2021-07-28 | End: 2021-07-29

## 2021-07-28 RX ORDER — PROMETHAZINE HYDROCHLORIDE 12.5 MG/1
12.5 TABLET ORAL EVERY 6 HOURS PRN
Status: CANCELLED | OUTPATIENT
Start: 2021-07-28

## 2021-07-28 RX ORDER — CARBOPROST TROMETHAMINE 250 UG/ML
250 INJECTION, SOLUTION INTRAMUSCULAR AS NEEDED
Status: CANCELLED | OUTPATIENT
Start: 2021-07-28

## 2021-07-28 RX ORDER — SODIUM CHLORIDE 0.9 % (FLUSH) 0.9 %
3-10 SYRINGE (ML) INJECTION AS NEEDED
Status: DISCONTINUED | OUTPATIENT
Start: 2021-07-28 | End: 2021-07-29

## 2021-07-28 RX ORDER — OXYTOCIN/0.9 % SODIUM CHLORIDE 30/500 ML
85 PLASTIC BAG, INJECTION (ML) INTRAVENOUS ONCE
Status: CANCELLED | OUTPATIENT
Start: 2021-07-28 | End: 2021-07-28

## 2021-07-28 RX ORDER — ONDANSETRON 4 MG/1
4 TABLET, FILM COATED ORAL ONCE AS NEEDED
Status: DISCONTINUED | OUTPATIENT
Start: 2021-07-28 | End: 2021-07-29

## 2021-07-28 RX ORDER — SODIUM CHLORIDE 0.9 % (FLUSH) 0.9 %
3-10 SYRINGE (ML) INJECTION AS NEEDED
Status: CANCELLED | OUTPATIENT
Start: 2021-07-28

## 2021-07-28 RX ORDER — PROMETHAZINE HYDROCHLORIDE 12.5 MG/1
12.5 SUPPOSITORY RECTAL EVERY 6 HOURS PRN
Status: DISCONTINUED | OUTPATIENT
Start: 2021-07-28 | End: 2021-07-29

## 2021-07-28 RX ORDER — PROMETHAZINE HYDROCHLORIDE 12.5 MG/1
12.5 TABLET ORAL EVERY 6 HOURS PRN
Status: DISCONTINUED | OUTPATIENT
Start: 2021-07-28 | End: 2021-07-29

## 2021-07-28 RX ORDER — HYDROCODONE BITARTRATE AND ACETAMINOPHEN 5; 325 MG/1; MG/1
2 TABLET ORAL ONCE AS NEEDED
Status: CANCELLED | OUTPATIENT
Start: 2021-07-28

## 2021-07-28 RX ORDER — LIDOCAINE HYDROCHLORIDE 10 MG/ML
5 INJECTION, SOLUTION EPIDURAL; INFILTRATION; INTRACAUDAL; PERINEURAL AS NEEDED
Status: CANCELLED | OUTPATIENT
Start: 2021-07-28

## 2021-07-28 RX ORDER — MORPHINE SULFATE 4 MG/ML
4 INJECTION, SOLUTION INTRAMUSCULAR; INTRAVENOUS ONCE AS NEEDED
Status: COMPLETED | OUTPATIENT
Start: 2021-07-28 | End: 2021-07-29

## 2021-07-28 RX ORDER — ONDANSETRON 2 MG/ML
4 INJECTION INTRAMUSCULAR; INTRAVENOUS ONCE AS NEEDED
Status: DISCONTINUED | OUTPATIENT
Start: 2021-07-28 | End: 2021-07-29

## 2021-07-28 RX ORDER — ACETAMINOPHEN 325 MG/1
650 TABLET ORAL ONCE AS NEEDED
Status: DISCONTINUED | OUTPATIENT
Start: 2021-07-28 | End: 2021-07-29

## 2021-07-28 RX ORDER — OXYTOCIN/0.9 % SODIUM CHLORIDE 30/500 ML
650 PLASTIC BAG, INJECTION (ML) INTRAVENOUS ONCE
Status: DISCONTINUED | OUTPATIENT
Start: 2021-07-28 | End: 2021-07-29

## 2021-07-28 RX ORDER — MORPHINE SULFATE 2 MG/ML
2 INJECTION, SOLUTION INTRAMUSCULAR; INTRAVENOUS ONCE AS NEEDED
Status: COMPLETED | OUTPATIENT
Start: 2021-07-28 | End: 2021-07-29

## 2021-07-28 RX ORDER — OXYTOCIN/0.9 % SODIUM CHLORIDE 30/500 ML
85 PLASTIC BAG, INJECTION (ML) INTRAVENOUS ONCE
Status: DISCONTINUED | OUTPATIENT
Start: 2021-07-28 | End: 2021-07-29

## 2021-07-28 RX ORDER — OXYTOCIN/0.9 % SODIUM CHLORIDE 30/500 ML
1-20 PLASTIC BAG, INJECTION (ML) INTRAVENOUS
Status: CANCELLED | OUTPATIENT
Start: 2021-07-28

## 2021-07-28 RX ORDER — OXYTOCIN/0.9 % SODIUM CHLORIDE 30/500 ML
650 PLASTIC BAG, INJECTION (ML) INTRAVENOUS ONCE
Status: CANCELLED | OUTPATIENT
Start: 2021-07-28 | End: 2021-07-28

## 2021-07-28 RX ORDER — ONDANSETRON 2 MG/ML
4 INJECTION INTRAMUSCULAR; INTRAVENOUS ONCE AS NEEDED
Status: CANCELLED | OUTPATIENT
Start: 2021-07-28

## 2021-07-28 RX ORDER — MISOPROSTOL 200 UG/1
800 TABLET ORAL AS NEEDED
Status: DISCONTINUED | OUTPATIENT
Start: 2021-07-28 | End: 2021-07-29

## 2021-07-28 RX ORDER — MISOPROSTOL 200 UG/1
800 TABLET ORAL AS NEEDED
Status: CANCELLED | OUTPATIENT
Start: 2021-07-28

## 2021-07-28 RX ORDER — SODIUM CHLORIDE, SODIUM LACTATE, POTASSIUM CHLORIDE, CALCIUM CHLORIDE 600; 310; 30; 20 MG/100ML; MG/100ML; MG/100ML; MG/100ML
125 INJECTION, SOLUTION INTRAVENOUS CONTINUOUS
Status: DISCONTINUED | OUTPATIENT
Start: 2021-07-28 | End: 2021-07-29

## 2021-07-28 RX ORDER — METHYLERGONOVINE MALEATE 0.2 MG/ML
200 INJECTION INTRAVENOUS ONCE AS NEEDED
Status: COMPLETED | OUTPATIENT
Start: 2021-07-28 | End: 2021-07-29

## 2021-07-28 RX ORDER — SODIUM CHLORIDE, SODIUM LACTATE, POTASSIUM CHLORIDE, CALCIUM CHLORIDE 600; 310; 30; 20 MG/100ML; MG/100ML; MG/100ML; MG/100ML
125 INJECTION, SOLUTION INTRAVENOUS CONTINUOUS
Status: CANCELLED | OUTPATIENT
Start: 2021-07-28

## 2021-07-28 RX ORDER — OXYTOCIN/0.9 % SODIUM CHLORIDE 30/500 ML
1-20 PLASTIC BAG, INJECTION (ML) INTRAVENOUS
Status: DISCONTINUED | OUTPATIENT
Start: 2021-07-28 | End: 2021-07-31 | Stop reason: HOSPADM

## 2021-07-28 RX ORDER — ONDANSETRON 4 MG/1
4 TABLET, FILM COATED ORAL ONCE AS NEEDED
Status: CANCELLED | OUTPATIENT
Start: 2021-07-28

## 2021-07-28 RX ADMIN — Medication 1 MILLI-UNITS/MIN: at 21:03

## 2021-07-28 RX ADMIN — SODIUM CHLORIDE, POTASSIUM CHLORIDE, SODIUM LACTATE AND CALCIUM CHLORIDE 125 ML/HR: 600; 310; 30; 20 INJECTION, SOLUTION INTRAVENOUS at 17:15

## 2021-07-29 ENCOUNTER — ANESTHESIA (OUTPATIENT)
Dept: LABOR AND DELIVERY | Facility: HOSPITAL | Age: 26
End: 2021-07-29

## 2021-07-29 ENCOUNTER — ANESTHESIA EVENT (OUTPATIENT)
Dept: LABOR AND DELIVERY | Facility: HOSPITAL | Age: 26
End: 2021-07-29

## 2021-07-29 LAB
A1 MICROGLOB PLACENTAL VAG QL: NEGATIVE
BACTERIA SPEC AEROBE CULT: NO GROWTH

## 2021-07-29 PROCEDURE — 25010000002 CHLOROPROCAINE HCL (PF) 3 % SOLUTION: Performed by: NURSE ANESTHETIST, CERTIFIED REGISTERED

## 2021-07-29 PROCEDURE — 51703 INSERT BLADDER CATH COMPLEX: CPT

## 2021-07-29 PROCEDURE — 59410 OBSTETRICAL CARE: CPT | Performed by: NURSE PRACTITIONER

## 2021-07-29 PROCEDURE — 25010000002 METHYLERGONOVINE MALEATE PER 0.2 MG

## 2021-07-29 PROCEDURE — 25010000002 FENTANYL CITRATE (PF) 100 MCG/2ML SOLUTION: Performed by: NURSE ANESTHETIST, CERTIFIED REGISTERED

## 2021-07-29 PROCEDURE — 25010000003 CEFAZOLIN SODIUM-DEXTROSE 1-4 GM-%(50ML) RECONSTITUTED SOLUTION: Performed by: NURSE PRACTITIONER

## 2021-07-29 PROCEDURE — 25010000002 ROPIVACAINE PER 1 MG: Performed by: NURSE ANESTHETIST, CERTIFIED REGISTERED

## 2021-07-29 PROCEDURE — 25010000002 FENTANYL CITRATE (PF) 250 MCG/5ML SOLUTION 5 ML VIAL: Performed by: NURSE ANESTHETIST, CERTIFIED REGISTERED

## 2021-07-29 PROCEDURE — 51702 INSERT TEMP BLADDER CATH: CPT

## 2021-07-29 PROCEDURE — C1755 CATHETER, INTRASPINAL: HCPCS | Performed by: NURSE ANESTHETIST, CERTIFIED REGISTERED

## 2021-07-29 PROCEDURE — 25010000002 MORPHINE SULFATE (PF) 2 MG/ML SOLUTION: Performed by: OBSTETRICS & GYNECOLOGY

## 2021-07-29 PROCEDURE — 25010000002 MORPHINE PER 10 MG: Performed by: OBSTETRICS & GYNECOLOGY

## 2021-07-29 PROCEDURE — 10907ZC DRAINAGE OF AMNIOTIC FLUID, THERAPEUTIC FROM PRODUCTS OF CONCEPTION, VIA NATURAL OR ARTIFICIAL OPENING: ICD-10-PCS | Performed by: OBSTETRICS & GYNECOLOGY

## 2021-07-29 RX ORDER — PROMETHAZINE HYDROCHLORIDE 12.5 MG/1
12.5 SUPPOSITORY RECTAL EVERY 6 HOURS PRN
Status: DISCONTINUED | OUTPATIENT
Start: 2021-07-29 | End: 2021-07-31 | Stop reason: HOSPADM

## 2021-07-29 RX ORDER — EPHEDRINE SULFATE 5 MG/ML
5 INJECTION INTRAVENOUS
Status: DISCONTINUED | OUTPATIENT
Start: 2021-07-29 | End: 2021-07-29

## 2021-07-29 RX ORDER — FENTANYL CITRATE 50 UG/ML
INJECTION, SOLUTION INTRAMUSCULAR; INTRAVENOUS AS NEEDED
Status: DISCONTINUED | OUTPATIENT
Start: 2021-07-29 | End: 2021-07-29 | Stop reason: SURG

## 2021-07-29 RX ORDER — ONDANSETRON 4 MG/1
4 TABLET, FILM COATED ORAL EVERY 8 HOURS PRN
Status: DISCONTINUED | OUTPATIENT
Start: 2021-07-29 | End: 2021-07-31 | Stop reason: HOSPADM

## 2021-07-29 RX ORDER — IBUPROFEN 800 MG/1
800 TABLET ORAL EVERY 8 HOURS
Status: DISCONTINUED | OUTPATIENT
Start: 2021-07-30 | End: 2021-07-31 | Stop reason: HOSPADM

## 2021-07-29 RX ORDER — LANOLIN
CREAM (GRAM) TOPICAL
Status: DISCONTINUED | OUTPATIENT
Start: 2021-07-29 | End: 2021-07-31 | Stop reason: HOSPADM

## 2021-07-29 RX ORDER — IBUPROFEN 600 MG/1
600 TABLET ORAL EVERY 6 HOURS PRN
Status: DISCONTINUED | OUTPATIENT
Start: 2021-07-29 | End: 2021-07-31 | Stop reason: HOSPADM

## 2021-07-29 RX ORDER — SODIUM CHLORIDE 0.9 % (FLUSH) 0.9 %
1-10 SYRINGE (ML) INJECTION AS NEEDED
Status: DISCONTINUED | OUTPATIENT
Start: 2021-07-29 | End: 2021-07-31 | Stop reason: HOSPADM

## 2021-07-29 RX ORDER — DOCUSATE SODIUM 100 MG/1
100 CAPSULE, LIQUID FILLED ORAL 2 TIMES DAILY
Status: DISCONTINUED | OUTPATIENT
Start: 2021-07-30 | End: 2021-07-31 | Stop reason: HOSPADM

## 2021-07-29 RX ORDER — CHLOROPROCAINE HYDROCHLORIDE 30 MG/ML
INJECTION, SOLUTION EPIDURAL; INFILTRATION; INTRACAUDAL; PERINEURAL AS NEEDED
Status: DISCONTINUED | OUTPATIENT
Start: 2021-07-29 | End: 2021-07-29 | Stop reason: SURG

## 2021-07-29 RX ORDER — ONDANSETRON 2 MG/ML
4 INJECTION INTRAMUSCULAR; INTRAVENOUS EVERY 6 HOURS PRN
Status: DISCONTINUED | OUTPATIENT
Start: 2021-07-29 | End: 2021-07-31 | Stop reason: HOSPADM

## 2021-07-29 RX ORDER — TRISODIUM CITRATE DIHYDRATE AND CITRIC ACID MONOHYDRATE 500; 334 MG/5ML; MG/5ML
30 SOLUTION ORAL ONCE
Status: DISCONTINUED | OUTPATIENT
Start: 2021-07-29 | End: 2021-07-29

## 2021-07-29 RX ORDER — HYDROCODONE BITARTRATE AND ACETAMINOPHEN 5; 325 MG/1; MG/1
1 TABLET ORAL EVERY 4 HOURS PRN
Status: DISCONTINUED | OUTPATIENT
Start: 2021-07-29 | End: 2021-07-30

## 2021-07-29 RX ORDER — LIDOCAINE HYDROCHLORIDE 20 MG/ML
INJECTION, SOLUTION EPIDURAL; INFILTRATION; INTRACAUDAL; PERINEURAL AS NEEDED
Status: DISCONTINUED | OUTPATIENT
Start: 2021-07-29 | End: 2021-07-29 | Stop reason: SURG

## 2021-07-29 RX ORDER — CEFAZOLIN SODIUM 1 G/50ML
1 SOLUTION INTRAVENOUS EVERY 8 HOURS
Status: DISCONTINUED | OUTPATIENT
Start: 2021-07-29 | End: 2021-07-29

## 2021-07-29 RX ORDER — ACETAMINOPHEN 500 MG
1000 TABLET ORAL EVERY 8 HOURS
Status: DISCONTINUED | OUTPATIENT
Start: 2021-07-30 | End: 2021-07-31 | Stop reason: HOSPADM

## 2021-07-29 RX ORDER — HYDROCORTISONE 25 MG/G
1 CREAM TOPICAL AS NEEDED
Status: DISCONTINUED | OUTPATIENT
Start: 2021-07-29 | End: 2021-07-31 | Stop reason: HOSPADM

## 2021-07-29 RX ORDER — PRENATAL VIT/IRON FUM/FOLIC AC 27MG-0.8MG
1 TABLET ORAL DAILY
Status: DISCONTINUED | OUTPATIENT
Start: 2021-07-30 | End: 2021-07-31 | Stop reason: HOSPADM

## 2021-07-29 RX ORDER — ONDANSETRON 2 MG/ML
4 INJECTION INTRAMUSCULAR; INTRAVENOUS ONCE AS NEEDED
Status: DISCONTINUED | OUTPATIENT
Start: 2021-07-29 | End: 2021-07-29

## 2021-07-29 RX ORDER — HYDROCODONE BITARTRATE AND ACETAMINOPHEN 7.5; 325 MG/1; MG/1
1 TABLET ORAL EVERY 4 HOURS PRN
Status: DISCONTINUED | OUTPATIENT
Start: 2021-07-29 | End: 2021-07-30

## 2021-07-29 RX ORDER — FENTANYL CITRATE 50 UG/ML
INJECTION, SOLUTION INTRAMUSCULAR; INTRAVENOUS
Status: COMPLETED
Start: 2021-07-29 | End: 2021-07-29

## 2021-07-29 RX ORDER — BISACODYL 10 MG
10 SUPPOSITORY, RECTAL RECTAL DAILY PRN
Status: DISCONTINUED | OUTPATIENT
Start: 2021-07-30 | End: 2021-07-31 | Stop reason: HOSPADM

## 2021-07-29 RX ORDER — METHYLERGONOVINE MALEATE 0.2 MG/ML
INJECTION INTRAVENOUS
Status: COMPLETED
Start: 2021-07-29 | End: 2021-07-29

## 2021-07-29 RX ORDER — OXYCODONE HYDROCHLORIDE AND ACETAMINOPHEN 5; 325 MG/1; MG/1
1 TABLET ORAL EVERY 4 HOURS PRN
Status: DISCONTINUED | OUTPATIENT
Start: 2021-07-29 | End: 2021-07-29

## 2021-07-29 RX ORDER — DIPHENHYDRAMINE HCL 25 MG
25 CAPSULE ORAL NIGHTLY PRN
Status: DISCONTINUED | OUTPATIENT
Start: 2021-07-29 | End: 2021-07-31 | Stop reason: HOSPADM

## 2021-07-29 RX ORDER — PROMETHAZINE HYDROCHLORIDE 25 MG/1
25 TABLET ORAL EVERY 6 HOURS PRN
Status: DISCONTINUED | OUTPATIENT
Start: 2021-07-29 | End: 2021-07-31 | Stop reason: HOSPADM

## 2021-07-29 RX ADMIN — MORPHINE SULFATE 4 MG: 4 INJECTION, SOLUTION INTRAMUSCULAR; INTRAVENOUS at 10:40

## 2021-07-29 RX ADMIN — SODIUM CHLORIDE, POTASSIUM CHLORIDE, SODIUM LACTATE AND CALCIUM CHLORIDE 1000 ML: 600; 310; 30; 20 INJECTION, SOLUTION INTRAVENOUS at 09:11

## 2021-07-29 RX ADMIN — CEFAZOLIN SODIUM 1 G: 1 SOLUTION INTRAVENOUS at 22:40

## 2021-07-29 RX ADMIN — CHLOROPROCAINE HYDROCHLORIDE 10 ML: 30 INJECTION, SOLUTION EPIDURAL; INFILTRATION; INTRACAUDAL; PERINEURAL at 19:50

## 2021-07-29 RX ADMIN — LIDOCAINE HYDROCHLORIDE 10 ML: 20 INJECTION, SOLUTION EPIDURAL; INFILTRATION; INTRACAUDAL; PERINEURAL at 16:45

## 2021-07-29 RX ADMIN — HYDROCODONE BITARTRATE AND ACETAMINOPHEN 2 TABLET: 5; 325 TABLET ORAL at 21:07

## 2021-07-29 RX ADMIN — FENTANYL CITRATE 100 MCG: 50 INJECTION, SOLUTION INTRAMUSCULAR; INTRAVENOUS at 12:22

## 2021-07-29 RX ADMIN — SODIUM CHLORIDE, POTASSIUM CHLORIDE, SODIUM LACTATE AND CALCIUM CHLORIDE 1000 ML: 600; 310; 30; 20 INJECTION, SOLUTION INTRAVENOUS at 09:55

## 2021-07-29 RX ADMIN — ROPIVACAINE HYDROCHLORIDE 14 ML/HR: 2 INJECTION, SOLUTION EPIDURAL; INFILTRATION at 19:10

## 2021-07-29 RX ADMIN — CHLOROPROCAINE HYDROCHLORIDE 10 ML: 30 INJECTION, SOLUTION EPIDURAL; INFILTRATION; INTRACAUDAL; PERINEURAL at 19:10

## 2021-07-29 RX ADMIN — SODIUM CHLORIDE, POTASSIUM CHLORIDE, SODIUM LACTATE AND CALCIUM CHLORIDE 125 ML/HR: 600; 310; 30; 20 INJECTION, SOLUTION INTRAVENOUS at 00:58

## 2021-07-29 RX ADMIN — SODIUM CHLORIDE, POTASSIUM CHLORIDE, SODIUM LACTATE AND CALCIUM CHLORIDE 125 ML/HR: 600; 310; 30; 20 INJECTION, SOLUTION INTRAVENOUS at 13:41

## 2021-07-29 RX ADMIN — LIDOCAINE HYDROCHLORIDE 8 ML: 20 INJECTION, SOLUTION EPIDURAL; INFILTRATION; INTRACAUDAL; PERINEURAL at 12:22

## 2021-07-29 RX ADMIN — METHYLERGONOVINE MALEATE 200 MCG: 0.2 INJECTION, SOLUTION INTRAMUSCULAR; INTRAVENOUS at 20:23

## 2021-07-29 RX ADMIN — ROPIVACAINE HYDROCHLORIDE 10 ML/HR: 2 INJECTION, SOLUTION EPIDURAL; INFILTRATION at 10:20

## 2021-07-29 RX ADMIN — METHYLERGONOVINE MALEATE 200 MCG: 0.2 INJECTION INTRAVENOUS at 20:23

## 2021-07-29 RX ADMIN — MORPHINE SULFATE 2 MG: 2 INJECTION, SOLUTION INTRAMUSCULAR; INTRAVENOUS at 20:26

## 2021-07-29 NOTE — ANESTHESIA PREPROCEDURE EVALUATION
Anesthesia Evaluation     Patient summary reviewed and Nursing notes reviewed   no history of anesthetic complications:  NPO Solid Status: > 8 hours  NPO Liquid Status: > 8 hours           Airway   Mallampati: II  TM distance: >3 FB  Neck ROM: full  no difficulty expected  Dental - normal exam     Pulmonary - negative pulmonary ROS and normal exam   Cardiovascular - normal exam    Rhythm: regular  Rate: normal    (+) valvular problems/murmurs murmur,       Neuro/Psych  (+) psychiatric history Anxiety,     GI/Hepatic/Renal/Endo - negative ROS     Musculoskeletal (-) negative ROS    Abdominal    Substance History - negative use     OB/GYN    (+) Pregnant,         Other - negative ROS                       Anesthesia Plan    ASA 2 - emergent     epidural   (Risks of epidural analgesia discussed, including but not limited to:  Headache, itching, n&v, infection, failure, decreased bp, decreased fetal hr, possible c/s. Permanent chronic back pain, nerve damage, paralysis, etc.    Patient told that epidural analgesia may not relieve all the labor pain, that she may still feel pressure and that she may still feel pain as the baby is close to delivery.     All questions answered and informed consent obtained.    )    Anesthetic plan, all risks, benefits, and alternatives have been provided, discussed and informed consent has been obtained with: patient.

## 2021-07-29 NOTE — ANESTHESIA PROCEDURE NOTES
Labor Epidural      Patient location during procedure: OB  Performed By  CRNA: Zeyad Linn CRNA  Preanesthetic Checklist  Completed: patient identified, IV checked, site marked, risks and benefits discussed, surgical consent, monitors and equipment checked, pre-op evaluation and timeout performed  Additional Notes  Risks discussed , including but not limited to:  Headache, itching, n&v, infection, failure, decreased blood pressure, permanent chronic/back pain, nerve damage, paralysis, etc. All questions answered and informed consent obtained. Skin localized with lidocaine skin wheel. Test dose _ 3ml of 1.5% lidocaine with 1:200,000 epi.  Prep:  Pt Position:sitting  Sterile Tech:cap, gloves, gown, mask and sterile barrier  Prep:chlorhexidine gluconate and isopropyl alcohol  Monitoring:blood pressure monitoring and continuous pulse oximetry  Epidural Block Procedure:  Approach:midline  Guidance:landmark technique and palpation technique  Location:L3-L4  Needle Type:Tuohy  Needle Gauge:18 G  Loss of Resistance: 5cm  Cath Depth at skin:8 cm  Paresthesia: none  Aspiration:negative  Test Dose:negative  Number of Attempts: 1  Post Assessment:  Dressing:occlusive dressing applied and secured with tape  Pt Tolerance:patient tolerated the procedure well with no apparent complications  Complications:no

## 2021-07-30 LAB
BASOPHILS # BLD AUTO: 0.03 10*3/MM3 (ref 0–0.2)
BASOPHILS NFR BLD AUTO: 0.2 % (ref 0–1.5)
DEPRECATED RDW RBC AUTO: 40.5 FL (ref 37–54)
EOSINOPHIL # BLD AUTO: 0.04 10*3/MM3 (ref 0–0.4)
EOSINOPHIL NFR BLD AUTO: 0.3 % (ref 0.3–6.2)
ERYTHROCYTE [DISTWIDTH] IN BLOOD BY AUTOMATED COUNT: 12.5 % (ref 12.3–15.4)
HCT VFR BLD AUTO: 27.3 % (ref 34–46.6)
HGB BLD-MCNC: 9.3 G/DL (ref 12–15.9)
IMM GRANULOCYTES # BLD AUTO: 0.06 10*3/MM3 (ref 0–0.05)
IMM GRANULOCYTES NFR BLD AUTO: 0.5 % (ref 0–0.5)
LYMPHOCYTES # BLD AUTO: 1.45 10*3/MM3 (ref 0.7–3.1)
LYMPHOCYTES NFR BLD AUTO: 11.9 % (ref 19.6–45.3)
MCH RBC QN AUTO: 30.5 PG (ref 26.6–33)
MCHC RBC AUTO-ENTMCNC: 34.1 G/DL (ref 31.5–35.7)
MCV RBC AUTO: 89.5 FL (ref 79–97)
MONOCYTES # BLD AUTO: 0.73 10*3/MM3 (ref 0.1–0.9)
MONOCYTES NFR BLD AUTO: 6 % (ref 5–12)
NEUTROPHILS NFR BLD AUTO: 81.1 % (ref 42.7–76)
NEUTROPHILS NFR BLD AUTO: 9.85 10*3/MM3 (ref 1.7–7)
NRBC BLD AUTO-RTO: 0 /100 WBC (ref 0–0.2)
PLATELET # BLD AUTO: 122 10*3/MM3 (ref 140–450)
PMV BLD AUTO: 10.3 FL (ref 6–12)
RBC # BLD AUTO: 3.05 10*6/MM3 (ref 3.77–5.28)
WBC # BLD AUTO: 12.16 10*3/MM3 (ref 3.4–10.8)

## 2021-07-30 PROCEDURE — 51702 INSERT TEMP BLADDER CATH: CPT

## 2021-07-30 PROCEDURE — 85025 COMPLETE CBC W/AUTO DIFF WBC: CPT | Performed by: NURSE PRACTITIONER

## 2021-07-30 RX ORDER — OXYCODONE HYDROCHLORIDE 5 MG/1
5 TABLET ORAL EVERY 4 HOURS PRN
Status: DISCONTINUED | OUTPATIENT
Start: 2021-07-30 | End: 2021-07-31 | Stop reason: HOSPADM

## 2021-07-30 RX ORDER — FERROUS SULFATE TAB EC 324 MG (65 MG FE EQUIVALENT) 324 (65 FE) MG
324 TABLET DELAYED RESPONSE ORAL 2 TIMES DAILY WITH MEALS
Status: DISCONTINUED | OUTPATIENT
Start: 2021-07-30 | End: 2021-07-31 | Stop reason: HOSPADM

## 2021-07-30 RX ADMIN — PRENATAL VITAMINS-IRON FUMARATE 27 MG IRON-FOLIC ACID 0.8 MG TABLET 1 TABLET: at 08:46

## 2021-07-30 RX ADMIN — IBUPROFEN 800 MG: 800 TABLET, FILM COATED ORAL at 03:46

## 2021-07-30 RX ADMIN — IBUPROFEN 800 MG: 800 TABLET, FILM COATED ORAL at 22:32

## 2021-07-30 RX ADMIN — IBUPROFEN 800 MG: 800 TABLET, FILM COATED ORAL at 13:04

## 2021-07-30 RX ADMIN — OXYCODONE HYDROCHLORIDE 5 MG: 5 TABLET ORAL at 09:27

## 2021-07-30 RX ADMIN — DOCUSATE SODIUM 100 MG: 100 CAPSULE ORAL at 22:32

## 2021-07-30 RX ADMIN — ACETAMINOPHEN 1000 MG: 500 TABLET ORAL at 23:59

## 2021-07-30 RX ADMIN — SERTRALINE HYDROCHLORIDE 50 MG: 50 TABLET ORAL at 13:04

## 2021-07-30 RX ADMIN — BENZOCAINE AND LEVOMENTHOL: 200; 5 SPRAY TOPICAL at 08:50

## 2021-07-30 RX ADMIN — ACETAMINOPHEN 1000 MG: 500 TABLET ORAL at 01:03

## 2021-07-30 RX ADMIN — DOCUSATE SODIUM 100 MG: 100 CAPSULE ORAL at 01:03

## 2021-07-30 RX ADMIN — OXYCODONE HYDROCHLORIDE 5 MG: 5 TABLET ORAL at 16:55

## 2021-07-30 RX ADMIN — DOCUSATE SODIUM 100 MG: 100 CAPSULE ORAL at 08:46

## 2021-07-30 RX ADMIN — FERROUS SULFATE TAB EC 324 MG (65 MG FE EQUIVALENT) 324 MG: 324 (65 FE) TABLET DELAYED RESPONSE at 18:38

## 2021-07-30 RX ADMIN — ACETAMINOPHEN 1000 MG: 500 TABLET ORAL at 08:45

## 2021-07-30 RX ADMIN — PRAMOXINE HYDROCHLORIDE HYDROCORTISONE ACETATE: 100; 100 AEROSOL, FOAM TOPICAL at 08:50

## 2021-07-30 RX ADMIN — OXYCODONE HYDROCHLORIDE 5 MG: 5 TABLET ORAL at 13:04

## 2021-07-30 RX ADMIN — ACETAMINOPHEN 1000 MG: 500 TABLET ORAL at 16:55

## 2021-07-30 NOTE — ANESTHESIA POSTPROCEDURE EVALUATION
Patient: Veronika Hall    Procedure Summary     Date: 07/29/21 Room / Location:     Anesthesia Start: 1000 Anesthesia Stop: 2000    Procedure: LABOR ANALGESIA Diagnosis:     Scheduled Providers:  Provider: Zeyad Linn CRNA    Anesthesia Type: epidural ASA Status: 2 - Emergent          Anesthesia Type: epidural    Vitals  Vitals Value Taken Time   /57 07/29/21 1931   Temp 98.2 °F (36.8 °C) 07/29/21 1454   Pulse 82 07/29/21 1931   Resp     SpO2 100 % 07/29/21 1330   Vitals shown include unvalidated device data.        Post Anesthesia Care and Evaluation    Patient location during evaluation: PACU  Patient participation: complete - patient participated  Level of consciousness: awake  Pain score: 1  Pain management: adequate  Airway patency: patent  Anesthetic complications: No anesthetic complications  PONV Status: controlled  Cardiovascular status: acceptable and stable  Respiratory status: acceptable  Hydration status: acceptable

## 2021-07-30 NOTE — PROGRESS NOTES
Chief Complaint  Routine Prenatal Visit (Patient complains of pressure and back pain. )    History of Present Illness:  Veronika is a  currently at 40w2d who presents today with complaints of irregular contractions.  Pt does report she was seen on labor mujica last night for decreased fetal movement.  Pt denies any vaginal bleeding or spotting.  She denies leaking any fluid.    Exam:  Vitals:  See prenatal flowsheet as noted and reviewed  General: Alert, cooperative, and does not appear in any distress  Abdomen:   See prenatal flowsheet as noted and reviewed    Uterus gravid, non-tender; no palpable masses    No guarding or rebound tenderness  Pelvic:  See prenatal flowsheet as noted and reviewed  Ext:  See prenatal flowsheet as noted and reviewed    Moves extremities well, no cyanosis and no redness  Urine:  See prenatal flowsheet as noted and reviewed    Data Review:  The following data was reviewed by: Dee Rodriguez MD on 2021:  Prenatal Labs:  Lab Results   Component Value Date    RUBELLAABIGG <0.90 (L) 2021    HEPBSAG Negative 2021    ABO A 2021    RH Positive 2021    ABSCRN Negative 2021    LER6YKJ2 Non Reactive 2021    HEPCVIRUSABY <0.1 2021    STREPGPB Negative 2021    URINECX No growth 2021       Admission on 2021   Component Date Value   • ABO Type 2021 A    • RH type 2021 Positive    • Antibody Screen 2021 Negative    • WBC 2021 6.63    • RBC 2021 3.50*   • Hemoglobin 2021 10.8*   • Hematocrit 2021 31.1*   • MCV 2021 88.9    • MCH 2021 30.9    • MCHC 2021 34.7    • RDW 2021 12.6    • RDW-SD 2021 40.3    • MPV 2021 10.0    • Platelets 2021 153    • Neutrophil % 2021 69.2    • Lymphocyte % 2021 22.2    • Monocyte % 2021 7.2    • Eosinophil % 2021 0.6    • Basophil % 2021 0.3    • Immature Grans % 2021 0.5    • Neutrophils,  Absolute 2021 4.59    • Lymphocytes, Absolute 2021 1.47    • Monocytes, Absolute 2021 0.48    • Eosinophils, Absolute 2021 0.04    • Basophils, Absolute 2021 0.02    • Immature Grans, Absolute 2021 0.03    • nRBC 2021 0.0    • ABO Type 2021 A    • RH type 2021 Positive    • COVID19 2021 Not Detected    • Color, UA 2021 Yellow    • Appearance, UA 2021 Clear    • pH, UA 2021 7.0    • Specific Gravity, UA 2021 1.010    • Glucose, UA 2021 Negative    • Ketones, UA 2021 Negative    • Bilirubin, UA 2021 Negative    • Blood, UA 2021 Negative    • Protein, UA 2021 Negative    • Leuk Esterase, UA 2021 Negative    • Nitrite, UA 2021 Negative    • Urobilinogen, UA 2021 0.2 E.U./dL    • Rupture of Membranes 2021 Negative    Routine Prenatal on 2021   Component Date Value   • Strep Gp B SINAN 2021 Negative      Imaging:  US Fetal Biophysical Profile Without Non Stress Testing Francisco Hall  : 1995  MRN: [id number]  Date: 2021    Reason for exam/History:  Post dates    Ultrasound images are reviewed.  There is a single viable intrauterine   pregnancy noted. The fetus was in the vertex presentation.  The fetal   heart rate was normal.      The biophysical profile was 8/8:  2 points for fetal breathing movements,   2 points for fetal tone, 2 points for amniotic fluid volume, and 2 points   for fetal movement.  The LBUA was 8.67 cms. the amniotic fluid appears   echogenic.  There is also fetal hydronephrosis on the left measuring 9.5   mm.    See official report for measurements and structures identified.    Dee Rodriguez MD, MS  [facility]  OB GYN [place]    Medical Records:  None    Assessment and Plan:  Problem List Items Addressed This Visit     None      Visit Diagnoses     Encounter for supervision of normal first pregnancy in third  trimester    -  Primary  Topics discussed:     induction of labor - I had a long detailed and extensive discussion with the patient and her significant other.  I discussed with the patient. The patient has been informed regarding the ultrasound findings today. I recommend an induction of labor. I discussed with the patient the risks, benefits, as well as other alternatives. I have discussed with the patient and her significant other the process of induction. Patient voices understanding. We will schedule Burnett bulb induction this evening.  kick counts and fetal movement  labor signs and symptoms  PIH precautions      Fetal hydronephrosis  Patient informed regarding findings today on ultrasound. Pediatrician needs to be informed so infant can have a follow-up scan as discussed with patient.  Oligohydramnios in third trimester, single or unspecified fetus      Plan induction of labor as noted.    Decreased fetal movements in third trimester, single or unspecified fetus      Plan induction of labor as noted.        Follow Up/Instructions:  Follow up as scheduled.  Patient was given instructions and counseling regarding her condition or for health maintenance advice. Please see specific information pulled into the AVS if appropriate.     Note: Speech recognition transcription software may have been used to dictate portions of this document.  An attempt at proofreading has been made though minor errors in transcription may still be present.    This note was electronically signed.  Dee Rodriguez M.D.

## 2021-07-31 VITALS
WEIGHT: 183 LBS | HEIGHT: 69 IN | OXYGEN SATURATION: 99 % | TEMPERATURE: 98.2 F | BODY MASS INDEX: 27.11 KG/M2 | DIASTOLIC BLOOD PRESSURE: 58 MMHG | HEART RATE: 76 BPM | RESPIRATION RATE: 17 BRPM | SYSTOLIC BLOOD PRESSURE: 108 MMHG

## 2021-07-31 PROCEDURE — 90471 IMMUNIZATION ADMIN: CPT | Performed by: NURSE PRACTITIONER

## 2021-07-31 PROCEDURE — 25010000002 MEASLES, MUMPS & RUBELLA VAC RECONSTITUTED SOLUTION: Performed by: NURSE PRACTITIONER

## 2021-07-31 PROCEDURE — 90707 MMR VACCINE SC: CPT | Performed by: NURSE PRACTITIONER

## 2021-07-31 RX ORDER — IBUPROFEN 800 MG/1
800 TABLET ORAL EVERY 8 HOURS PRN
Qty: 60 TABLET | Refills: 0 | Status: SHIPPED | OUTPATIENT
Start: 2021-07-31 | End: 2021-09-10

## 2021-07-31 RX ORDER — PSEUDOEPHEDRINE HCL 30 MG
100 TABLET ORAL 2 TIMES DAILY PRN
Qty: 30 CAPSULE | Refills: 0 | Status: SHIPPED | OUTPATIENT
Start: 2021-07-31 | End: 2021-09-10

## 2021-07-31 RX ORDER — ACETAMINOPHEN 500 MG
1000 TABLET ORAL EVERY 8 HOURS PRN
Qty: 60 TABLET | Refills: 0 | Status: SHIPPED | OUTPATIENT
Start: 2021-07-31 | End: 2021-09-10

## 2021-07-31 RX ADMIN — ACETAMINOPHEN 1000 MG: 500 TABLET ORAL at 09:05

## 2021-07-31 RX ADMIN — FERROUS SULFATE TAB EC 324 MG (65 MG FE EQUIVALENT) 324 MG: 324 (65 FE) TABLET DELAYED RESPONSE at 09:05

## 2021-07-31 RX ADMIN — IBUPROFEN 800 MG: 800 TABLET, FILM COATED ORAL at 05:41

## 2021-07-31 RX ADMIN — DOCUSATE SODIUM 100 MG: 100 CAPSULE ORAL at 09:05

## 2021-07-31 RX ADMIN — MEASLES, MUMPS, AND RUBELLA VIRUS VACCINE LIVE 0.5 ML: 1000; 12500; 1000 INJECTION, POWDER, LYOPHILIZED, FOR SUSPENSION SUBCUTANEOUS at 11:58

## 2021-09-10 ENCOUNTER — POSTPARTUM VISIT (OUTPATIENT)
Dept: OBSTETRICS AND GYNECOLOGY | Facility: CLINIC | Age: 26
End: 2021-09-10

## 2021-09-10 VITALS
DIASTOLIC BLOOD PRESSURE: 62 MMHG | SYSTOLIC BLOOD PRESSURE: 110 MMHG | WEIGHT: 156 LBS | HEIGHT: 69 IN | BODY MASS INDEX: 23.11 KG/M2

## 2021-09-10 PROBLEM — Z34.90 PREGNANCY: Status: RESOLVED | Noted: 2021-07-28 | Resolved: 2021-09-10

## 2021-09-10 PROCEDURE — 0503F POSTPARTUM CARE VISIT: CPT | Performed by: MIDWIFE

## 2021-11-22 NOTE — PROGRESS NOTES
"History  Chief Complaint   Patient presents with   • Postpartum Care     6 week post partum 2021 FLAKITA Ho delivered, breast feeding. Patient c/o strange sensation in vagina        Veronika Hall is a 26 y.o. year old  presenting to be seen for her postpartum visit.  She had a vaginal delivery.    Since delivery she has not been sexually active.   She does not have concerns about post-partum blues/depression. She is taking Zoloft.  She is breast feeding .  For ongoing contraception, her plans are condoms.  She has not had a menstrual cycle.         Physical  /62   Ht 175.3 cm (69\")   Wt 70.8 kg (156 lb)   LMP  (LMP Unknown)   Breastfeeding Yes   BMI 23.04 kg/m²     General:  well developed; well nourished  no acute distress   Abdomen: soft, non-tender; no masses  no umbilical or inguinal hernias are present   Pelvis: External genitalia:  normal appearance of the external genitalia including Bartholin's and [address]'s glands. Perineum healed  Vaginal:  normal pink mucosa without prolapse or lesions.  Cervix:  normal appearance.  Uterus:  normal size, shape and consistency.  Adnexa:  normal bimanual exam of the adnexa.        Assessment   1. Normal 6 week postpartum exam  2. Contraceptive management     Plan   1. BC options reviewed and compared today: condoms  Pap was done today.  If she does not receive the results of the Pap within 2 weeks  time, she was instructed to call to find out the results.  I explained to Veronika that the recommendations for Pap smear interval in a low risk patient  has lengthened to 3 years time.  I encouraged her to be seen yearly for a full physical exam including breast and pelvic exam even during the off years when PAP's will not be performed.  2. Follow up 1 year    No orders of the defined types were placed in this encounter.         This note was electronically signed.  Ksenia Hager, APRN  9/10/2021  "
Pf[employer] dose 1, 2, and 3

## 2021-12-07 ENCOUNTER — OFFICE VISIT (OUTPATIENT)
Dept: OBSTETRICS AND GYNECOLOGY | Facility: CLINIC | Age: 26
End: 2021-12-07

## 2021-12-07 VITALS
SYSTOLIC BLOOD PRESSURE: 108 MMHG | WEIGHT: 146 LBS | HEIGHT: 69 IN | BODY MASS INDEX: 21.62 KG/M2 | DIASTOLIC BLOOD PRESSURE: 64 MMHG

## 2021-12-07 DIAGNOSIS — L65.9 HAIR LOSS: ICD-10-CM

## 2021-12-07 DIAGNOSIS — R53.83 FATIGUE, UNSPECIFIED TYPE: Primary | ICD-10-CM

## 2021-12-07 DIAGNOSIS — R45.86 MOOD DISTURBANCE: ICD-10-CM

## 2021-12-07 PROCEDURE — 99213 OFFICE O/P EST LOW 20 MIN: CPT | Performed by: OBSTETRICS & GYNECOLOGY

## 2021-12-07 NOTE — PROGRESS NOTES
GYN Office Visit    Subjective   Chief Complaint   Patient presents with   • Fatigue     Wants to have thyroid checked, having severe fatigue and extreme hair loss.     Veronika Hall is a 26 y.o. year old  presenting to be seen for fatigue and hair loss.    She reports worsening fatigue and hair loss over the past several months.  She did have a vaginal delivery at the end of 2021.  She is currently breastfeeding exclusively.  She has a family history of Hashimoto's disease and thyroid cancer.  She gets yearly thyroid ultrasounds and lab work done.  She is concerned about thyroid disease and would like testing done today.  She also notes mood instability and irritability.  She is taking Zoloft.    Past Medical History:   Diagnosis Date   • Anemia    • Anxiety    • Disease of thyroid gland 2018    Pre Hashimoto's, nodules   • Fibroid 2019    Fibroadanoma-left breast   • Urinary tract infection        Past Surgical History:   Procedure Laterality Date   • BREAST BIOPSY  2019    Benign fibroadanoma   • CLAVICLE SURGERY      AS A CHILD   • EYE SURGERY Left     AS A CHILD   • WISDOM TOOTH EXTRACTION  2017       Family History   Problem Relation Age of Onset   • Hashimoto's thyroiditis Mother         Social History     Tobacco Use   • Smoking status: Never Smoker   • Smokeless tobacco: Never Used   Vaping Use   • Vaping Use: Never used   Substance Use Topics   • Alcohol use: Never   • Drug use: Never       (Not in a hospital admission)      Sulfa antibiotics    Current Outpatient Medications on File Prior to Visit   Medication Sig Dispense Refill   • Prenatal Vit-Fe Fumarate-FA (prenatal vitamin 27-0.8) 27-0.8 MG tablet tablet Take  by mouth Daily.     • sertraline (ZOLOFT) 50 MG tablet Take 1 tablet by mouth Daily. 30 tablet 6     No current facility-administered medications on file prior to visit.       Social History    Tobacco Use      Smoking status: Never Smoker      Smokeless tobacco: Never  "Used         Objective   /64   Ht 175.3 cm (69\")   Wt 66.2 kg (146 lb)   BMI 21.56 kg/m²     Physical Exam:  General Appearance: alert, pleasant, appears stated age, interactive and cooperative         Medical Decision Making:    Assessment   Fatigue  Hair loss  Mood instability and irritability  Recent vaginal delivery  Exclusively breastfeeding     Plan    Orders Placed This Encounter   Procedures   • TSH     Order Specific Question:   Release to patient     Answer:   Immediate   • T4, Free     Order Specific Question:   Release to patient     Answer:   Immediate       Medication Management: Continue Zoloft.    Procedures Performed: None    We reviewed her symptoms in detail today.  We discussed that these may be related to her postpartum status.  Nonetheless, given her family history, we will order thyroid labs today.  We will follow-up results by phone.  All questions answered.    Gabriel Flores MD  Obstetrics and Gynecology  [facility]  "

## 2021-12-08 LAB
T4 FREE SERPL-MCNC: 1.15 NG/DL (ref 0.93–1.7)
TSH SERPL DL<=0.005 MIU/L-ACNC: 0.67 UIU/ML (ref 0.27–4.2)

## 2022-02-16 ENCOUNTER — HOSPITAL ENCOUNTER (EMERGENCY)
Facility: HOSPITAL | Age: 27
Discharge: HOME | End: 2022-02-16
Attending: EMERGENCY MEDICINE | Admitting: EMERGENCY MEDICINE

## 2022-02-16 ENCOUNTER — NURSE TRIAGE (OUTPATIENT)
Dept: CALL CENTER | Facility: HOSPITAL | Age: 27
End: 2022-02-16

## 2022-02-16 VITALS
SYSTOLIC BLOOD PRESSURE: 106 MMHG | OXYGEN SATURATION: 98 % | HEART RATE: 86 BPM | TEMPERATURE: 98.7 F | RESPIRATION RATE: 16 BRPM | DIASTOLIC BLOOD PRESSURE: 52 MMHG | BODY MASS INDEX: 19.99 KG/M2 | WEIGHT: 135 LBS | HEIGHT: 69 IN

## 2022-02-16 DIAGNOSIS — R19.7 NAUSEA VOMITING AND DIARRHEA: Primary | ICD-10-CM

## 2022-02-16 DIAGNOSIS — R11.2 NAUSEA VOMITING AND DIARRHEA: Primary | ICD-10-CM

## 2022-02-16 LAB
ALBUMIN SERPL-MCNC: 5 G/DL (ref 3.5–5.2)
ALBUMIN/GLOB SERPL: 1.4 G/DL
ALP SERPL-CCNC: 110 U/L (ref 39–117)
ALT SERPL W P-5'-P-CCNC: 9 U/L (ref 1–33)
ANION GAP SERPL CALCULATED.3IONS-SCNC: 17.4 MMOL/L (ref 5–15)
AST SERPL-CCNC: 14 U/L (ref 1–32)
BASOPHILS # BLD AUTO: 0.05 10*3/MM3 (ref 0–0.2)
BASOPHILS NFR BLD AUTO: 0.6 % (ref 0–1.5)
BILIRUB SERPL-MCNC: 0.7 MG/DL (ref 0–1.2)
BUN SERPL-MCNC: 17 MG/DL (ref 6–20)
BUN/CREAT SERPL: 23.9 (ref 7–25)
CALCIUM SPEC-SCNC: 9.7 MG/DL (ref 8.6–10.5)
CHLORIDE SERPL-SCNC: 100 MMOL/L (ref 98–107)
CO2 SERPL-SCNC: 19.6 MMOL/L (ref 22–29)
CREAT SERPL-MCNC: 0.71 MG/DL (ref 0.57–1)
DEPRECATED RDW RBC AUTO: 43.3 FL (ref 37–54)
EOSINOPHIL # BLD AUTO: 0.07 10*3/MM3 (ref 0–0.4)
EOSINOPHIL NFR BLD AUTO: 0.8 % (ref 0.3–6.2)
ERYTHROCYTE [DISTWIDTH] IN BLOOD BY AUTOMATED COUNT: 13.3 % (ref 12.3–15.4)
GFR SERPL CREATININE-BSD FRML MDRD: 99 ML/MIN/1.73
GLOBULIN UR ELPH-MCNC: 3.7 GM/DL
GLUCOSE SERPL-MCNC: 131 MG/DL (ref 65–99)
HCT VFR BLD AUTO: 45 % (ref 34–46.6)
HGB BLD-MCNC: 14.4 G/DL (ref 12–15.9)
HOLD SPECIMEN: NORMAL
HOLD SPECIMEN: NORMAL
IMM GRANULOCYTES # BLD AUTO: 0.03 10*3/MM3 (ref 0–0.05)
IMM GRANULOCYTES NFR BLD AUTO: 0.4 % (ref 0–0.5)
LIPASE SERPL-CCNC: 25 U/L (ref 13–60)
LYMPHOCYTES # BLD AUTO: 0.37 10*3/MM3 (ref 0.7–3.1)
LYMPHOCYTES NFR BLD AUTO: 4.3 % (ref 19.6–45.3)
MCH RBC QN AUTO: 28.3 PG (ref 26.6–33)
MCHC RBC AUTO-ENTMCNC: 32 G/DL (ref 31.5–35.7)
MCV RBC AUTO: 88.6 FL (ref 79–97)
MONOCYTES # BLD AUTO: 0.36 10*3/MM3 (ref 0.1–0.9)
MONOCYTES NFR BLD AUTO: 4.2 % (ref 5–12)
NEUTROPHILS NFR BLD AUTO: 7.64 10*3/MM3 (ref 1.7–7)
NEUTROPHILS NFR BLD AUTO: 89.7 % (ref 42.7–76)
NRBC BLD AUTO-RTO: 0 /100 WBC (ref 0–0.2)
PLATELET # BLD AUTO: 236 10*3/MM3 (ref 140–450)
PMV BLD AUTO: 9.9 FL (ref 6–12)
POTASSIUM SERPL-SCNC: 4.2 MMOL/L (ref 3.5–5.2)
PROT SERPL-MCNC: 8.7 G/DL (ref 6–8.5)
RBC # BLD AUTO: 5.08 10*6/MM3 (ref 3.77–5.28)
SARS-COV-2 RNA PNL SPEC NAA+PROBE: NOT DETECTED
SODIUM SERPL-SCNC: 137 MMOL/L (ref 136–145)
WBC NRBC COR # BLD: 8.52 10*3/MM3 (ref 3.4–10.8)
WHOLE BLOOD HOLD SPECIMEN: NORMAL
WHOLE BLOOD HOLD SPECIMEN: NORMAL

## 2022-02-16 PROCEDURE — 80053 COMPREHEN METABOLIC PANEL: CPT | Performed by: PHYSICIAN ASSISTANT

## 2022-02-16 PROCEDURE — 96374 THER/PROPH/DIAG INJ IV PUSH: CPT

## 2022-02-16 PROCEDURE — 85025 COMPLETE CBC W/AUTO DIFF WBC: CPT | Performed by: PHYSICIAN ASSISTANT

## 2022-02-16 PROCEDURE — 87635 SARS-COV-2 COVID-19 AMP PRB: CPT | Performed by: PHYSICIAN ASSISTANT

## 2022-02-16 PROCEDURE — 36415 COLL VENOUS BLD VENIPUNCTURE: CPT

## 2022-02-16 PROCEDURE — 25010000002 ONDANSETRON PER 1 MG: Performed by: PHYSICIAN ASSISTANT

## 2022-02-16 PROCEDURE — 99283 EMERGENCY DEPT VISIT LOW MDM: CPT

## 2022-02-16 PROCEDURE — 83690 ASSAY OF LIPASE: CPT | Performed by: PHYSICIAN ASSISTANT

## 2022-02-16 RX ORDER — SODIUM CHLORIDE 0.9 % (FLUSH) 0.9 %
10 SYRINGE (ML) INJECTION AS NEEDED
Status: DISCONTINUED | OUTPATIENT
Start: 2022-02-16 | End: 2022-02-16 | Stop reason: HOSPADM

## 2022-02-16 RX ORDER — METOCLOPRAMIDE 10 MG/1
10 TABLET ORAL 4 TIMES DAILY PRN
Qty: 16 TABLET | Refills: 0 | Status: SHIPPED | OUTPATIENT
Start: 2022-02-16 | End: 2022-02-20

## 2022-02-16 RX ORDER — ONDANSETRON 2 MG/ML
4 INJECTION INTRAMUSCULAR; INTRAVENOUS ONCE
Status: COMPLETED | OUTPATIENT
Start: 2022-02-16 | End: 2022-02-16

## 2022-02-16 RX ADMIN — ONDANSETRON HYDROCHLORIDE 4 MG: 2 SOLUTION INTRAMUSCULAR; INTRAVENOUS at 15:48

## 2022-02-16 RX ADMIN — SODIUM CHLORIDE 1000 ML: 9 INJECTION, SOLUTION INTRAVENOUS at 15:48

## 2022-02-16 NOTE — ED PROVIDER NOTES
Subjective   Chief Complaint: Nausea vomiting diarrhea  History of Present Illness: 6-year-old female comes in for evaluation above complaint.  She states her 6-month-old child had similar symptoms yesterday and they resolved after 24 hours.  She complains about 20 episodes each of vomiting watery diarrhea nonbloody since this morning.  No abdominal pain.  No recent antibiotics.  Onset: This morning  Timing: Intermittent  Exacerbating / Alleviating factors: Worse with p.o. intake  Associated symptoms: None      Nurses Notes reviewed and agree, including vitals, allergies, social history and prior medical history.          Review of Systems   Constitutional: Negative.    HENT: Negative.    Eyes: Negative.    Respiratory: Negative.    Cardiovascular: Negative.    Gastrointestinal: Positive for diarrhea, nausea and vomiting. Negative for abdominal pain.   Genitourinary: Negative.    Musculoskeletal: Negative.    Skin: Negative.    Neurological: Negative.    Psychiatric/Behavioral: Negative.        Past Medical History:   Diagnosis Date   • Anemia 2020   • Anxiety    • Disease of thyroid gland 2018    Pre Hashimoto's, nodules   • Fibroid 2019    Fibroadanoma-left breast   • Urinary tract infection        Allergies   Allergen Reactions   • Sulfa Antibiotics Hives       Past Surgical History:   Procedure Laterality Date   • BREAST BIOPSY  2019    Benign fibroadanoma   • CLAVICLE SURGERY      AS A CHILD   • EYE SURGERY Left     AS A CHILD   • WISDOM TOOTH EXTRACTION  2017       Family History   Problem Relation Age of Onset   • Hashimoto's thyroiditis Mother        Social History     Socioeconomic History   • Marital status:    • Number of children: 0   • Years of education: 4   • Highest education level: 12th grade   Tobacco Use   • Smoking status: Never Smoker   • Smokeless tobacco: Never Used   Vaping Use   • Vaping Use: Never used   Substance and Sexual Activity   • Alcohol use: Never   • Drug use: Never   •  Sexual activity: Yes     Partners: Male     Birth control/protection: Condom           Objective   Physical Exam  Vitals and nursing note reviewed.   Constitutional:       Appearance: Normal appearance.   HENT:      Head: Normocephalic and atraumatic.      Nose: Nose normal.   Eyes:      Extraocular Movements: Extraocular movements intact.   Cardiovascular:      Rate and Rhythm: Normal rate and regular rhythm.   Pulmonary:      Effort: Pulmonary effort is normal.   Abdominal:      General: Abdomen is flat.      Palpations: Abdomen is soft.      Tenderness: There is no abdominal tenderness. There is no guarding or rebound.   Musculoskeletal:         General: Normal range of motion.   Skin:     General: Skin is warm and dry.   Neurological:      General: No focal deficit present.      Mental Status: She is alert.   Psychiatric:         Mood and Affect: Mood normal.         Behavior: Behavior normal.         Procedures           ED Course  ED Course as of 02/16/22 1700   Wed Feb 16, 2022   1626 COVID19: Not Detected [TM]      ED Course User Index  [TM] Lars Johnson PA-C                                                 [facility]  Patient feeling much better at this time.  Nausea resolved.  No abdominal pain.  She is breast-feeding given options of Phenergan Zofran Reglan, Reglan is listed as safest with no known risk of infant harm or adverse effects on milk production based on human data.  Will give short course  Final diagnoses:   Nausea vomiting and diarrhea       ED Disposition  ED Disposition     ED Disposition Condition Comment    Discharge Stable           [facility] Emergency Department  [address] [address] [phone]    If symptoms worsen         Medication List      New Prescriptions    metoclopramide 10 MG tablet  Commonly known as: Reglan  Take 1 tablet by mouth 4 (Four) Times a Day As Needed (nausea and vomiting) for up to 4 days.           Where to Get  Your Medications      These medications were sent to [facility] [address] [place] [address] - [address] [facility] - [phone]  - [phone]  [address] [facility] [address]    Phone: [phone]   · metoclopramide 10 MG tablet          Lars Johnson PA-C  02/16/22 1700       Lars Johnson PA-C  02/16/22 1703

## 2022-02-16 NOTE — TELEPHONE ENCOUNTER
"    Reason for Disposition  • SEVERE vomiting (e.g., 6 or more times/day) (Exception: patient sounds well, is drinking liquids, does not sound dehydrated, and vomiting has lasted less than 24 hours)    Additional Information  • Negative: Shock suspected (e.g., cold/pale/clammy skin, too weak to stand, low BP, rapid pulse)  • Negative: Difficult to awaken or acting confused (e.g., disoriented, slurred speech)  • Negative: Sounds like a life-threatening emergency to the triager  • Negative: Vomiting occurs only while coughing  • Negative: Pregnant < 20 Weeks and nausea/vomiting began in early pregnancy (i.e., 4-8 weeks pregnant)  • Negative: Chest pain  • Negative: Headache is main symptom  • Negative: MODERATE vomiting (e.g., 3 - 5 times/day) and age > 60 years    Answer Assessment - Initial Assessment Questions  1. VOMITING SEVERITY: \"How many times have you vomited in the past 24 hours?\"      - MILD:  1 - 2 times/day     - MODERATE: 3 - 5 times/day, decreased oral intake without significant weight loss or symptoms of dehydration     - SEVERE: 6 or more times/day, vomits everything or nearly everything, with significant weight loss, symptoms of dehydration       12 times toay  2. ONSET: \"When did the vomiting begin?\"       today  3. FLUIDS: \"What fluids or food have you vomited up today?\" \"Have you been able to keep any fluids down?\"      Sipped some water, scarce amount able to keep down according to caller  4. ABDOMINAL PAIN: \"Are your having any abdominal pain?\" If yes : \"How bad is it and what does it feel like?\" (e.g., crampy, dull, intermittent, constant)       none  5. DIARRHEA: \"Is there any diarrhea?\" If Yes, ask: \"How many times today?\"       Yes x 12 toay  6. CONTACTS: \"Is there anyone else in the family with the same symptoms?\"       Yes her child was sick a few days ago with this  7. CAUSE: \"What do you think is causing your vomiting?\"      virus  8. HYDRATION STATUS: \"Any signs of dehydration?\" (e.g., " "dry mouth [not only dry lips], too weak to stand) \"When did you last urinate?\"      Mouth dry is not sure when voided due to so much diarrhea  9. OTHER SYMPTOMS: \"Do you have any other symptoms?\" (e.g., fever, headache, vertigo, vomiting blood or coffee grounds, recent head injury)     Dizzy and her hearing becomes muted right before she vomits  10. PREGNANCY: \"Is there any chance you are pregnant?\" \"When was your last menstrual period?\"        No. Is breastfeeding 6 month old child menstrualy cycles have not come back yet.    Protocols used: VOMITING-ADULT-OH      "

## 2022-02-22 ENCOUNTER — HOSPITAL ENCOUNTER (OUTPATIENT)
Facility: HOSPITAL | Age: 27
Discharge: HOME | End: 2022-02-23
Attending: EMERGENCY MEDICINE | Admitting: UROLOGY

## 2022-02-22 ENCOUNTER — APPOINTMENT (OUTPATIENT)
Dept: CT IMAGING | Facility: HOSPITAL | Age: 27
End: 2022-02-22

## 2022-02-22 DIAGNOSIS — N13.2 URETERAL STONE WITH HYDRONEPHROSIS: Primary | ICD-10-CM

## 2022-02-22 LAB
ALBUMIN SERPL-MCNC: 4.2 G/DL (ref 3.5–5.2)
ALBUMIN/GLOB SERPL: 1.4 G/DL
ALP SERPL-CCNC: 75 U/L (ref 39–117)
ALT SERPL W P-5'-P-CCNC: 9 U/L (ref 1–33)
ANION GAP SERPL CALCULATED.3IONS-SCNC: 14.1 MMOL/L (ref 5–15)
AST SERPL-CCNC: 12 U/L (ref 1–32)
BACTERIA UR QL AUTO: ABNORMAL /HPF
BASOPHILS # BLD AUTO: 0.02 10*3/MM3 (ref 0–0.2)
BASOPHILS NFR BLD AUTO: 0.4 % (ref 0–1.5)
BILIRUB SERPL-MCNC: 0.3 MG/DL (ref 0–1.2)
BILIRUB UR QL STRIP: NEGATIVE
BUN SERPL-MCNC: 7 MG/DL (ref 6–20)
BUN/CREAT SERPL: 11.3 (ref 7–25)
CALCIUM SPEC-SCNC: 9.1 MG/DL (ref 8.6–10.5)
CHLORIDE SERPL-SCNC: 102 MMOL/L (ref 98–107)
CLARITY UR: CLEAR
CO2 SERPL-SCNC: 19.9 MMOL/L (ref 22–29)
COLOR UR: YELLOW
CREAT SERPL-MCNC: 0.62 MG/DL (ref 0.57–1)
D-LACTATE SERPL-SCNC: 0.8 MMOL/L (ref 0.5–2)
D-LACTATE SERPL-SCNC: 3.1 MMOL/L (ref 0.5–2)
DEPRECATED RDW RBC AUTO: 39.5 FL (ref 37–54)
EOSINOPHIL # BLD AUTO: 0.07 10*3/MM3 (ref 0–0.4)
EOSINOPHIL NFR BLD AUTO: 1.3 % (ref 0.3–6.2)
ERYTHROCYTE [DISTWIDTH] IN BLOOD BY AUTOMATED COUNT: 13.1 % (ref 12.3–15.4)
GFR SERPL CREATININE-BSD FRML MDRD: 115 ML/MIN/1.73
GLOBULIN UR ELPH-MCNC: 3 GM/DL
GLUCOSE SERPL-MCNC: 125 MG/DL (ref 65–99)
GLUCOSE UR STRIP-MCNC: NEGATIVE MG/DL
HCG SERPL QL: NEGATIVE
HCT VFR BLD AUTO: 34.9 % (ref 34–46.6)
HGB BLD-MCNC: 12.1 G/DL (ref 12–15.9)
HGB UR QL STRIP.AUTO: ABNORMAL
HOLD SPECIMEN: NORMAL
HYALINE CASTS UR QL AUTO: ABNORMAL /LPF
IMM GRANULOCYTES # BLD AUTO: 0.01 10*3/MM3 (ref 0–0.05)
IMM GRANULOCYTES NFR BLD AUTO: 0.2 % (ref 0–0.5)
KETONES UR QL STRIP: ABNORMAL
LEUKOCYTE ESTERASE UR QL STRIP.AUTO: NEGATIVE
LIPASE SERPL-CCNC: 26 U/L (ref 13–60)
LYMPHOCYTES # BLD AUTO: 1.69 10*3/MM3 (ref 0.7–3.1)
LYMPHOCYTES NFR BLD AUTO: 30.2 % (ref 19.6–45.3)
MAGNESIUM SERPL-MCNC: 1.6 MG/DL (ref 1.6–2.6)
MCH RBC QN AUTO: 28.9 PG (ref 26.6–33)
MCHC RBC AUTO-ENTMCNC: 34.7 G/DL (ref 31.5–35.7)
MCV RBC AUTO: 83.3 FL (ref 79–97)
MONOCYTES # BLD AUTO: 0.35 10*3/MM3 (ref 0.1–0.9)
MONOCYTES NFR BLD AUTO: 6.3 % (ref 5–12)
NEUTROPHILS NFR BLD AUTO: 3.45 10*3/MM3 (ref 1.7–7)
NEUTROPHILS NFR BLD AUTO: 61.6 % (ref 42.7–76)
NITRITE UR QL STRIP: NEGATIVE
NRBC BLD AUTO-RTO: 0 /100 WBC (ref 0–0.2)
PH UR STRIP.AUTO: 8.5 [PH] (ref 5–8)
PLATELET # BLD AUTO: 224 10*3/MM3 (ref 140–450)
PMV BLD AUTO: 10.2 FL (ref 6–12)
POTASSIUM SERPL-SCNC: 3 MMOL/L (ref 3.5–5.2)
PROT SERPL-MCNC: 7.2 G/DL (ref 6–8.5)
PROT UR QL STRIP: NEGATIVE
RBC # BLD AUTO: 4.19 10*6/MM3 (ref 3.77–5.28)
RBC # UR STRIP: ABNORMAL /HPF
REF LAB TEST METHOD: ABNORMAL
SODIUM SERPL-SCNC: 136 MMOL/L (ref 136–145)
SP GR UR STRIP: 1.02 (ref 1–1.03)
SQUAMOUS #/AREA URNS HPF: ABNORMAL /HPF
UROBILINOGEN UR QL STRIP: ABNORMAL
WBC # UR STRIP: ABNORMAL /HPF
WBC NRBC COR # BLD: 5.59 10*3/MM3 (ref 3.4–10.8)
WHOLE BLOOD HOLD SPECIMEN: NORMAL
WHOLE BLOOD HOLD SPECIMEN: NORMAL

## 2022-02-22 PROCEDURE — G0378 HOSPITAL OBSERVATION PER HR: HCPCS

## 2022-02-22 PROCEDURE — 83690 ASSAY OF LIPASE: CPT | Performed by: EMERGENCY MEDICINE

## 2022-02-22 PROCEDURE — 74176 CT ABD & PELVIS W/O CONTRAST: CPT

## 2022-02-22 PROCEDURE — 25010000002 KETOROLAC TROMETHAMINE PER 15 MG: Performed by: PHYSICIAN ASSISTANT

## 2022-02-22 PROCEDURE — 25010000002 ONDANSETRON PER 1 MG: Performed by: EMERGENCY MEDICINE

## 2022-02-22 PROCEDURE — 85025 COMPLETE CBC W/AUTO DIFF WBC: CPT | Performed by: EMERGENCY MEDICINE

## 2022-02-22 PROCEDURE — 99204 OFFICE O/P NEW MOD 45 MIN: CPT | Performed by: INTERNAL MEDICINE

## 2022-02-22 PROCEDURE — 96376 TX/PRO/DX INJ SAME DRUG ADON: CPT

## 2022-02-22 PROCEDURE — 96361 HYDRATE IV INFUSION ADD-ON: CPT

## 2022-02-22 PROCEDURE — 25010000002 MORPHINE PER 10 MG: Performed by: EMERGENCY MEDICINE

## 2022-02-22 PROCEDURE — 96374 THER/PROPH/DIAG INJ IV PUSH: CPT

## 2022-02-22 PROCEDURE — 99284 EMERGENCY DEPT VISIT MOD MDM: CPT

## 2022-02-22 PROCEDURE — 96375 TX/PRO/DX INJ NEW DRUG ADDON: CPT

## 2022-02-22 PROCEDURE — 83735 ASSAY OF MAGNESIUM: CPT | Performed by: PHYSICIAN ASSISTANT

## 2022-02-22 PROCEDURE — 25010000002 KETOROLAC TROMETHAMINE PER 15 MG: Performed by: INTERNAL MEDICINE

## 2022-02-22 PROCEDURE — 81001 URINALYSIS AUTO W/SCOPE: CPT | Performed by: EMERGENCY MEDICINE

## 2022-02-22 PROCEDURE — 80053 COMPREHEN METABOLIC PANEL: CPT | Performed by: EMERGENCY MEDICINE

## 2022-02-22 PROCEDURE — 84703 CHORIONIC GONADOTROPIN ASSAY: CPT | Performed by: EMERGENCY MEDICINE

## 2022-02-22 PROCEDURE — 83605 ASSAY OF LACTIC ACID: CPT | Performed by: PHYSICIAN ASSISTANT

## 2022-02-22 RX ORDER — MORPHINE SULFATE 4 MG/ML
4 INJECTION, SOLUTION INTRAMUSCULAR; INTRAVENOUS ONCE
Status: DISCONTINUED | OUTPATIENT
Start: 2022-02-22 | End: 2022-02-22

## 2022-02-22 RX ORDER — KETOROLAC TROMETHAMINE 30 MG/ML
30 INJECTION, SOLUTION INTRAMUSCULAR; INTRAVENOUS EVERY 6 HOURS PRN
Status: DISCONTINUED | OUTPATIENT
Start: 2022-02-22 | End: 2022-02-23 | Stop reason: HOSPADM

## 2022-02-22 RX ORDER — KETOROLAC TROMETHAMINE 30 MG/ML
30 INJECTION, SOLUTION INTRAMUSCULAR; INTRAVENOUS ONCE
Status: DISCONTINUED | OUTPATIENT
Start: 2022-02-22 | End: 2022-02-22

## 2022-02-22 RX ORDER — SODIUM CHLORIDE 9 MG/ML
100 INJECTION, SOLUTION INTRAVENOUS CONTINUOUS
Status: DISCONTINUED | OUTPATIENT
Start: 2022-02-22 | End: 2022-02-23 | Stop reason: HOSPADM

## 2022-02-22 RX ORDER — KETOROLAC TROMETHAMINE 30 MG/ML
15 INJECTION, SOLUTION INTRAMUSCULAR; INTRAVENOUS ONCE
Status: COMPLETED | OUTPATIENT
Start: 2022-02-22 | End: 2022-02-22

## 2022-02-22 RX ORDER — ACETAMINOPHEN 325 MG/1
650 TABLET ORAL EVERY 4 HOURS PRN
Status: DISCONTINUED | OUTPATIENT
Start: 2022-02-22 | End: 2022-02-23 | Stop reason: HOSPADM

## 2022-02-22 RX ORDER — ONDANSETRON 2 MG/ML
4 INJECTION INTRAMUSCULAR; INTRAVENOUS ONCE
Status: COMPLETED | OUTPATIENT
Start: 2022-02-22 | End: 2022-02-22

## 2022-02-22 RX ORDER — SODIUM CHLORIDE 0.9 % (FLUSH) 0.9 %
10 SYRINGE (ML) INJECTION AS NEEDED
Status: DISCONTINUED | OUTPATIENT
Start: 2022-02-22 | End: 2022-02-23 | Stop reason: HOSPADM

## 2022-02-22 RX ORDER — POTASSIUM CHLORIDE 750 MG/1
40 CAPSULE, EXTENDED RELEASE ORAL ONCE
Status: COMPLETED | OUTPATIENT
Start: 2022-02-22 | End: 2022-02-22

## 2022-02-22 RX ORDER — ACETAMINOPHEN 650 MG/1
650 SUPPOSITORY RECTAL EVERY 4 HOURS PRN
Status: DISCONTINUED | OUTPATIENT
Start: 2022-02-22 | End: 2022-02-23 | Stop reason: HOSPADM

## 2022-02-22 RX ORDER — ACETAMINOPHEN 160 MG/5ML
650 SOLUTION ORAL EVERY 4 HOURS PRN
Status: DISCONTINUED | OUTPATIENT
Start: 2022-02-22 | End: 2022-02-23 | Stop reason: HOSPADM

## 2022-02-22 RX ORDER — SODIUM CHLORIDE 0.9 % (FLUSH) 0.9 %
10 SYRINGE (ML) INJECTION EVERY 12 HOURS SCHEDULED
Status: DISCONTINUED | OUTPATIENT
Start: 2022-02-22 | End: 2022-02-23 | Stop reason: HOSPADM

## 2022-02-22 RX ORDER — ONDANSETRON 2 MG/ML
4 INJECTION INTRAMUSCULAR; INTRAVENOUS EVERY 6 HOURS PRN
Status: DISCONTINUED | OUTPATIENT
Start: 2022-02-22 | End: 2022-02-23 | Stop reason: HOSPADM

## 2022-02-22 RX ORDER — ONDANSETRON 2 MG/ML
4 INJECTION INTRAMUSCULAR; INTRAVENOUS ONCE
Status: DISCONTINUED | OUTPATIENT
Start: 2022-02-22 | End: 2022-02-22

## 2022-02-22 RX ORDER — MORPHINE SULFATE 4 MG/ML
4 INJECTION, SOLUTION INTRAMUSCULAR; INTRAVENOUS ONCE
Status: COMPLETED | OUTPATIENT
Start: 2022-02-22 | End: 2022-02-22

## 2022-02-22 RX ADMIN — POTASSIUM CHLORIDE 40 MEQ: 750 CAPSULE, EXTENDED RELEASE ORAL at 18:36

## 2022-02-22 RX ADMIN — ONDANSETRON 4 MG: 2 INJECTION INTRAMUSCULAR; INTRAVENOUS at 11:54

## 2022-02-22 RX ADMIN — KETOROLAC TROMETHAMINE 15 MG: 30 INJECTION, SOLUTION INTRAMUSCULAR; INTRAVENOUS at 13:21

## 2022-02-22 RX ADMIN — MORPHINE SULFATE 4 MG: 4 INJECTION, SOLUTION INTRAMUSCULAR; INTRAVENOUS at 11:47

## 2022-02-22 RX ADMIN — Medication 10 ML: at 20:44

## 2022-02-22 RX ADMIN — KETOROLAC TROMETHAMINE 30 MG: 30 INJECTION, SOLUTION INTRAMUSCULAR; INTRAVENOUS at 20:43

## 2022-02-22 RX ADMIN — SODIUM CHLORIDE 100 ML/HR: 9 INJECTION, SOLUTION INTRAVENOUS at 16:37

## 2022-02-22 NOTE — ED NOTES
Patient taken to ct at this time- resting slightly better- ice at bedside for patient-  Instructed  on small amount of ice at a time     Irena Segovia RN  02/22/22 6206

## 2022-02-22 NOTE — ED PROVIDER NOTES
Subjective   Chief Complaint: Abdominal pain  History of Present Illness: 26-year-old female comes in for evaluation above complaint.  She states acutely today she had onset of lower abdominal pain.  No nausea or vomiting.  No normal bowel movement for about a week.  She has passed some small hard stools infrequently.  She also states that when she tries to urinate she can only dribble.  She is moaning and pacing about the room she appears very anxious  Onset: Today  Timing: Ongoing  Exacerbating / Alleviating factors: None  Associated symptoms: None      Nurses Notes reviewed and agree, including vitals, allergies, social history and prior medical history.          Review of Systems   Constitutional: Negative.    HENT: Negative.    Eyes: Negative.    Respiratory: Negative.    Cardiovascular: Negative.    Gastrointestinal: Positive for abdominal pain, constipation and nausea. Negative for rectal pain and vomiting.   Genitourinary: Negative.    Musculoskeletal: Negative.    Skin: Negative.    Neurological: Negative.    Psychiatric/Behavioral: Negative.        Past Medical History:   Diagnosis Date   • Anemia 2020   • Anxiety    • Disease of thyroid gland 2018    Pre Hashimoto's, nodules   • Fibroid 2019    Fibroadanoma-left breast   • Urinary tract infection        Allergies   Allergen Reactions   • Sulfa Antibiotics Hives       Past Surgical History:   Procedure Laterality Date   • BREAST BIOPSY  2019    Benign fibroadanoma   • CLAVICLE SURGERY      AS A CHILD   • EYE SURGERY Left     AS A CHILD   • WISDOM TOOTH EXTRACTION  2017       Family History   Problem Relation Age of Onset   • Hashimoto's thyroiditis Mother        Social History     Socioeconomic History   • Marital status:    • Number of children: 0   • Years of education: 4   • Highest education level: 12th grade   Tobacco Use   • Smoking status: Never Smoker   • Smokeless tobacco: Never Used   Vaping Use   • Vaping Use: Never used   Substance and  Sexual Activity   • Alcohol use: Never   • Drug use: Never   • Sexual activity: Yes     Partners: Male     Birth control/protection: Condom           Objective   Physical Exam  Vitals and nursing note reviewed.   Constitutional:       General: She is not in acute distress.     Appearance: She is well-developed and normal weight. She is not ill-appearing, toxic-appearing or diaphoretic.   HENT:      Head: Normocephalic and atraumatic.   Cardiovascular:      Rate and Rhythm: Normal rate and regular rhythm.   Pulmonary:      Effort: Pulmonary effort is normal.   Abdominal:      General: Abdomen is flat. Bowel sounds are normal.      Palpations: Abdomen is soft.      Tenderness: There is no abdominal tenderness. There is no guarding or rebound.   Skin:     General: Skin is warm and dry.   Neurological:      General: No focal deficit present.      Mental Status: She is alert.   Psychiatric:         Mood and Affect: Mood normal.         Behavior: Behavior normal.         Procedures           ED Course  ED Course as of 02/22/22 1440   Tue Feb 22, 2022   1204 Lipase: 26 [TM]   1204 WBC: 5.59 [TM]   1204 Hemoglobin: 12.1 [TM]   1204 Hematocrit: 34.9 [TM]      ED Course User Index  [TM] Lars Johnson PA-C      1144  Patient now does agree to Toradol.  Repeat examination she appears very anxious and tearful although abdomen is soft.  She seems to be focusing on getting her child formula though her child is not here.   at bedside at this time tried to console her.    1300  Patient seems to have calmed little bit.  Declines any more pain medicine at this time.  States she has not had a menses since delivering her most recent child July 2021.  No vaginal bleeding.  No noted hematuria at home.  No history of kidney stones but she does now tell me the pain is in her right back radiating around her right abdomen.  Will obtain a CT scan without contrast to rule out ureteral stone    1406  Patient initially wanted  to stay in her procedure but then changed her mind and want to go home.  Made an appointment for 350 tomorrow.  Dr. Crowder's physician assistant, Jordin, is on her way down to see the patient 30 minutes.  Patient now tells me she has changed her mind and wishes to be admitted.    143  Patient seen and evaluated by Jordin MCBRIDE with Dr. Crowder's office.  We will plan to take to surgery tomorrow afternoon.  Will admit to hospitalist.  Paging Dr. Lozano now.     7170  Dr. Lozano Graciously accepts pt in admission                                        MDM    Final diagnoses:   Ureteral stone with hydronephrosis       ED Disposition  ED Disposition     ED Disposition Condition Comment    Decision to Admit  Level of Care: Med/Surg [1]   Diagnosis: Ureteral stone with hydronephrosis [678392]   Admitting Physician: YUDITH LOZANO [[id number]]   Attending Physician: YUDITH LOZANO [[id number]]            No follow-up provider specified.       Medication List      No changes were made to your prescriptions during this visit.          Lars Johnson PA-C  02/22/22 7900

## 2022-02-22 NOTE — PLAN OF CARE
Goal Outcome Evaluation:  Plan of Care Reviewed With: patient        Progress: no change  Outcome Summary: Patient to be NPO at midnight for procedure.

## 2022-02-22 NOTE — H&P
[facility]   HISTORY AND PHYSICAL      Name:  Veronika Hall   Age:  26 y.o.  Sex:  female  :  1995  MRN:  [id number]   Visit Number:  [id number]  Admission Date:  2022  Date Of Service:  22  Primary Care Physician:  Provider, No Known    Chief Complaint:     Right flank pain    History Of Presenting Illness:      Patient is a healthy 26-year-old female who presents to the emergency room complaining of worsening right flank pain with radiation into her groin.  Patient is 8 months postpartum and has been having this pain intermittently since prior to giving birth.  Pain became unbearable today for which her  brought her to the emergency room.  Patient denies hematuria and dysuria but does note some nausea without vomiting.  No history of kidney stones.  Denies fever/chills.  On arrival to ER, patient afebrile and hemodynamically stable.  Labs are significant for glucose 125, sodium 136, potassium 3.0, bicarb 20, creatinine 0.62 and BUN 7.  Lactate 3.1.  WBC, H&H and platelets all within normal limits.  Imaging revealed a 10 mm distal right ureteral stone causing severe right hydronephrosis.  Dr. Crowder agreed to consult.  Hospital service contacted for admission.    Review Of Systems:    All systems were reviewed and negative except as mentioned in history of presenting illness, assessment and plan.    Past Medical History: Patient  has a past medical history of Anemia (), Anxiety, Disease of thyroid gland (2018), Fibroid (2019), and Urinary tract infection.    Past Surgical History: Patient  has a past surgical history that includes Eye surgery (Left); Clavicle surgery; Breast biopsy (2019); and [place] tooth extraction (2017).    Social History: Patient  reports that she has never smoked. She has never used smokeless tobacco. She reports that she does not drink alcohol and does not use drugs.    Family History: Patient's family history includes Hashimoto's  "thyroiditis in her mother.    Allergies:      Sulfa antibiotics    Home Medications:    Prior to Admission Medications     Prescriptions Last Dose Informant Patient Reported? Taking?    Prenatal Vit-Fe Fumarate-FA (prenatal vitamin 27-0.8) 27-0.8 MG tablet tablet Past Week  Yes Yes    Take  by mouth Daily.    sertraline (ZOLOFT) 50 MG tablet Past Month  No Yes    Take 1 tablet by mouth Daily.        ED Medications:    Medications   sodium chloride 0.9 % flush 10 mL (has no administration in time range)   sodium chloride 0.9 % flush 10 mL (has no administration in time range)   sodium chloride 0.9 % flush 10 mL (has no administration in time range)   acetaminophen (TYLENOL) tablet 650 mg (has no administration in time range)     Or   acetaminophen (TYLENOL) 160 MG/5ML solution 650 mg (has no administration in time range)     Or   acetaminophen (TYLENOL) suppository 650 mg (has no administration in time range)   ondansetron (ZOFRAN) injection 4 mg (has no administration in time range)   enoxaparin (LOVENOX) syringe 40 mg (has no administration in time range)   sodium chloride 0.9 % infusion (has no administration in time range)   ketorolac (TORADOL) injection 30 mg (has no administration in time range)   Morphine sulfate (PF) injection 4 mg (4 mg Intravenous Given 2/22/22 1147)   ondansetron (ZOFRAN) injection 4 mg (4 mg Intravenous Given 2/22/22 1154)   ketorolac (TORADOL) injection 15 mg (15 mg Intravenous Given 2/22/22 1321)     Vital Signs:  Temp:  [98 °F (36.7 °C)-99.3 °F (37.4 °C)] 99.3 °F (37.4 °C)  Heart Rate:  [67-74] 74  Resp:  [16-26] 16  BP: (100-124)/() 118/67        02/22/22  1103 02/22/22  1111 02/22/22  1547   Weight: 61.2 kg (135 lb) 61.2 kg (135 lb) 62.1 kg (136 lb 14.5 oz)     Body mass index is 20.22 kg/m².    Physical Exam:     Most recent vital Signs: /67 (BP Location: Left arm, Patient Position: Lying)   Pulse 74   Temp 99.3 °F (37.4 °C) (Oral)   Resp 16   Ht 175.3 cm (69\")   Wt " 62.1 kg (136 lb 14.5 oz)   SpO2 95%   Breastfeeding Yes Comment: past 6 months  BMI 20.22 kg/m²     Physical Exam  Constitutional:       General: She is not in acute distress.     Appearance: Normal appearance. She is normal weight.   HENT:      Head: Normocephalic and atraumatic.      Right Ear: External ear normal.      Left Ear: External ear normal.      Mouth/Throat:      Mouth: Mucous membranes are dry.      Pharynx: Oropharynx is clear.   Eyes:      Extraocular Movements: Extraocular movements intact.      Conjunctiva/sclera: Conjunctivae normal.   Cardiovascular:      Rate and Rhythm: Normal rate and regular rhythm.      Pulses: Normal pulses.      Heart sounds: Normal heart sounds. No murmur heard.      Pulmonary:      Effort: Pulmonary effort is normal. No respiratory distress.      Breath sounds: Normal breath sounds.   Abdominal:      General: Bowel sounds are normal.      Palpations: Abdomen is soft.      Tenderness: There is right CVA tenderness.   Musculoskeletal:         General: Normal range of motion.      Right lower leg: No edema.      Left lower leg: No edema.   Skin:     General: Skin is warm and dry.   Neurological:      General: No focal deficit present.      Mental Status: She is alert and oriented to person, place, and time. Mental status is at baseline.   Psychiatric:         Mood and Affect: Mood normal.         Laboratory data:    I have reviewed the labs done in the emergency room.    Results from last 7 days   Lab Units 02/22/22  1135 02/16/22  1545   SODIUM mmol/L 136 137   POTASSIUM mmol/L 3.0* 4.2   CHLORIDE mmol/L 102 100   CO2 mmol/L 19.9* 19.6*   BUN mg/dL 7 17   CREATININE mg/dL 0.62 0.71   CALCIUM mg/dL 9.1 9.7   BILIRUBIN mg/dL 0.3 0.7   ALK PHOS U/L 75 110   ALT (SGPT) U/L 9 9   AST (SGOT) U/L 12 14   GLUCOSE mg/dL 125* 131*     Results from last 7 days   Lab Units 02/22/22  1135 02/16/22  1543   WBC 10*3/mm3 5.59 8.52   HEMOGLOBIN g/dL 12.1 14.4   HEMATOCRIT % 34.9 45.0    PLATELETS 10*3/mm3 224 236                     Results from last 7 days   Lab Units 02/22/22  1135   LIPASE U/L 26         Results from last 7 days   Lab Units 02/22/22  1210   COLOR UA  Yellow   GLUCOSE UA  Negative   KETONES UA  15 mg/dL (1+)*   LEUKOCYTES UA  Negative   PH, URINE  8.5*   BILIRUBIN UA  Negative   UROBILINOGEN UA  0.2 E.U./dL   RBC UA /HPF 21-30*   WBC UA /HPF 0-2*       Pain Management Panel    There is no flowsheet data to display.         EKG:          Radiology:    CT Abdomen Pelvis Without Contrast    Result Date: 2/22/2022  PROCEDURE: CT ABDOMEN PELVIS WO CONTRAST-  HISTORY: lower abdominal pain  COMPARISON: None .  PROCEDURE: Axial images were obtained from the lung bases through the pubic symphysis without intravenous contrast.  FINDINGS:  ABDOMEN: The lung bases are clear. The heart size is normal. The limited noncontrast images of the liver are normal. The spleen is normal. No adrenal masses are seen.  The pancreas has an unremarkable unenhanced appearance. The aorta is normal in caliber. There is no significant free fluid or adenopathy.  There is no nephrolithiasis. There is a 10 mm distal right ureteral stone causing severe right hydronephrosis. There is no left hydronephrosis. Limited noncontrast images of the bowel are unremarkable.  PELVIS: The appendix is normal. The urinary bladder is unremarkable. There is no significant fluid or adenopathy.      10 mm distal right ureteral stone causes severe right hydronephrosis.     467.35 mGy.cm 9.73 mGy  This study was performed with techniques to keep radiation doses as low as reasonably achievable (ALARA). Individualized dose reduction techniques using automated exposure control or adjustment of mA and/or kV according to the patient size were employed.  This report was signed and finalized on 2/22/2022 1:29 PM by En Martel M.D..      Assessment:    1. 10 mm right ureteral stone with severe right  hydronephrosis  2. Hypokalemia  3. Postpartum 6 months  4. Breast-feeding    Plan:    Admit patient to the hospital for observation.  Will start IV fluids, normal saline.  Pain control and antiemetics as needed.  Dr. Crowder agreed to consult.  Plan for urological intervention in the a.m.  N.p.o. after midnight.  Supportive care.  Replete electrolytes as indicated.  Plan for discharge tomorrow post procedure.    Risk Assessment: High  DVT Prophylaxis: Lovenox  Code Status: Full  Diet: N.p.o. after midnight    Advance Care Planning   ACP discussion was declined by the patient. Patient does not have an advance directive, declines further assistance.      Jaylen Kahn DO  02/22/22  16:05 EST    Dictated utilizing Dragon dictation.

## 2022-02-22 NOTE — ED NOTES
Patient has an appointment tomorrow at 1550 at Dr. Crowder's office in [place].     Stephani Flores  02/22/22 0432

## 2022-02-22 NOTE — CONSULTS
In Patient Consult      Patient Name: Veronika Hall  : 1995   MRN: [id number]   Admission Date: 2022 11:06 AM     Admission Diagnosis: Right ureteral stone with hydronephrosis    Care Team: Patient Care Team:  Provider, No Known as PCP - General    History of Present Illness: Veronika Hall is a 26 y.o. female who Urology was consulted to see for right ureteral stone with severe renal colic.  The patient is a healthy 26-year-old female who had sudden onset of lower abdominal pain this morning around 0900. The pain migrated around to the right flank and RLQ became constant and severe, prompting her to come to the ER. She complains of associated urinary urgency with dribbling and minimal output. She denies associated hematuria, fever, chills, vomiting, or nausea. She states she was in her normal state of health when she went to bed last night and now feels completely well in the ER after pain meds.     She does admit to dysuria, but states it has been ongoing and intermittent for about 6 months, when she delivered her son. She states it is not worse today and she has not been treated for a UTI for more than 6 months.     This is her first kidney stone. She admits to  with her father having many stones.    Subjective     Past Medical History:   Past Medical History:   Diagnosis Date   • Anemia    • Anxiety    • Disease of thyroid gland 2018    Pre Hashimoto's, nodules   • Fibroid 2019    Fibroadanoma-left breast   • Urinary tract infection        Past Surgical History:   Past Surgical History:   Procedure Laterality Date   • BREAST BIOPSY  2019    Benign fibroadanoma   • CLAVICLE SURGERY      AS A CHILD   • EYE SURGERY Left     AS A CHILD   • WISDOM TOOTH EXTRACTION  2017       Family History:   Family History   Problem Relation Age of Onset   • Hashimoto's thyroiditis Mother        Social History:   Social History     Socioeconomic History   • Marital status:    • Number of children: 0    • Years of education: 4   • Highest education level: 12th grade   Tobacco Use   • Smoking status: Never Smoker   • Smokeless tobacco: Never Used   Vaping Use   • Vaping Use: Never used   Substance and Sexual Activity   • Alcohol use: Never   • Drug use: Never   • Sexual activity: Yes     Partners: Male     Birth control/protection: Condom       Medications:     Current Facility-Administered Medications:   •  sodium chloride 0.9 % flush 10 mL, 10 mL, Intravenous, PRN, Rafael Oakes, DO    Current Outpatient Medications:   •  Prenatal Vit-Fe Fumarate-FA (prenatal vitamin 27-0.8) 27-0.8 MG tablet tablet, Take  by mouth Daily., Disp: , Rfl:   •  sertraline (ZOLOFT) 50 MG tablet, Take 1 tablet by mouth Daily., Disp: 30 tablet, Rfl: 6    Allergies:   Allergies   Allergen Reactions   • Sulfa Antibiotics Hives       Objective     Physical Exam:   Vital Signs:   Vitals:    02/22/22 1254 02/22/22 1257 02/22/22 1304 02/22/22 1324   BP:    119/79   BP Location:       Patient Position:       Pulse:       Resp:       Temp:       TempSrc:       SpO2: 99% 99% 100% 100%   Weight:       Height:         Body mass index is 19.94 kg/m².     Physical Exam  Vitals and nursing note reviewed.   Constitutional:       General: She is not in acute distress.     Appearance: Normal appearance. She is normal weight. She is not toxic-appearing.   HENT:      Head: Normocephalic and atraumatic.      Mouth/Throat:      Mouth: Mucous membranes are moist.   Eyes:      Extraocular Movements: Extraocular movements intact.      Conjunctiva/sclera: Conjunctivae normal.   Cardiovascular:      Rate and Rhythm: Normal rate.   Pulmonary:      Effort: Pulmonary effort is normal. No respiratory distress.   Abdominal:      General: Abdomen is flat.   Skin:     General: Skin is warm and dry.   Neurological:      Mental Status: She is alert.   Psychiatric:         Mood and Affect: Mood normal.         Behavior: Behavior normal.         Labs:   No intake/output  data recorded.    Lab Results   Component Value Date    GLUCOSE 125 (H) 02/22/2022    CALCIUM 9.1 02/22/2022     02/22/2022    K 3.0 (L) 02/22/2022    CO2 19.9 (L) 02/22/2022     02/22/2022    BUN 7 02/22/2022    CREATININE 0.62 02/22/2022    EGFRIFNONA 115 02/22/2022    BCR 11.3 02/22/2022    ANIONGAP 14.1 02/22/2022       Lab Results   Component Value Date    WBC 5.59 02/22/2022    HGB 12.1 02/22/2022    HCT 34.9 02/22/2022    MCV 83.3 02/22/2022     02/22/2022       No results found for: URINECX    Brief Urine Lab Results  (Last result in the past 365 days)      Color   Clarity   Blood   Leuk Est   Nitrite   Protein   CREAT   Urine HCG        02/22/22 1210 Yellow   Clear   Small (1+)   Negative   Negative   Negative                 Radiographic Studies  CT Abdomen Pelvis Without Contrast    Result Date: 2/22/2022  10 mm distal right ureteral stone causes severe right hydronephrosis.     467.35 mGy.cm 9.73 mGy  This study was performed with techniques to keep radiation doses as low as reasonably achievable (ALARA). Individualized dose reduction techniques using automated exposure control or adjustment of mA and/or kV according to the patient size were employed.  This report was signed and finalized on 2/22/2022 1:29 PM by En Martel M.D..      Patient Active Problem List   Diagnosis   • Mitral valve prolapse   • Ureteral stone with hydronephrosis       Assessment / Plan      Assessment/Plan:   1. 10mm distal right ureteral stone with hydronephrosis   a. UA reveals blood, minimal pyuria with negative nitrite/LE, trace bacteria with squamous cells present- low concern for UTI  b. Discussed with Dr. Crowder who will add on for right URS/LL tomorrow, last case; patient is consented  c. NPO at midnight  d. Continue pain controlled and nausea control per primary team      Follow Up:   1 week after URS for cystoscopy/stent removal in office with Dr. Vel Angulo PADIANNA  [facility]  [place]

## 2022-02-22 NOTE — ED NOTES
Patient refusing pain medication-  attempting to talk her to taking pain medications     Irena Segovia, RN  02/22/22 6086

## 2022-02-23 ENCOUNTER — ANESTHESIA EVENT (OUTPATIENT)
Dept: PERIOP | Facility: HOSPITAL | Age: 27
End: 2022-02-23

## 2022-02-23 ENCOUNTER — ANESTHESIA (OUTPATIENT)
Dept: PERIOP | Facility: HOSPITAL | Age: 27
End: 2022-02-23

## 2022-02-23 VITALS
HEIGHT: 69 IN | DIASTOLIC BLOOD PRESSURE: 71 MMHG | TEMPERATURE: 98 F | WEIGHT: 136.91 LBS | BODY MASS INDEX: 20.28 KG/M2 | SYSTOLIC BLOOD PRESSURE: 105 MMHG | RESPIRATION RATE: 16 BRPM | OXYGEN SATURATION: 100 % | HEART RATE: 65 BPM

## 2022-02-23 LAB
ANION GAP SERPL CALCULATED.3IONS-SCNC: 8 MMOL/L (ref 5–15)
BASOPHILS # BLD AUTO: 0.02 10*3/MM3 (ref 0–0.2)
BASOPHILS NFR BLD AUTO: 0.4 % (ref 0–1.5)
BUN SERPL-MCNC: 10 MG/DL (ref 6–20)
BUN/CREAT SERPL: 13.2 (ref 7–25)
CALCIUM SPEC-SCNC: 8.3 MG/DL (ref 8.6–10.5)
CHLORIDE SERPL-SCNC: 110 MMOL/L (ref 98–107)
CO2 SERPL-SCNC: 22 MMOL/L (ref 22–29)
CREAT SERPL-MCNC: 0.76 MG/DL (ref 0.57–1)
DEPRECATED RDW RBC AUTO: 42.1 FL (ref 37–54)
EOSINOPHIL # BLD AUTO: 0.07 10*3/MM3 (ref 0–0.4)
EOSINOPHIL NFR BLD AUTO: 1.4 % (ref 0.3–6.2)
ERYTHROCYTE [DISTWIDTH] IN BLOOD BY AUTOMATED COUNT: 13.3 % (ref 12.3–15.4)
GFR SERPL CREATININE-BSD FRML MDRD: 91 ML/MIN/1.73
GLUCOSE SERPL-MCNC: 89 MG/DL (ref 65–99)
HCT VFR BLD AUTO: 32.3 % (ref 34–46.6)
HGB BLD-MCNC: 10.7 G/DL (ref 12–15.9)
IMM GRANULOCYTES # BLD AUTO: 0.01 10*3/MM3 (ref 0–0.05)
IMM GRANULOCYTES NFR BLD AUTO: 0.2 % (ref 0–0.5)
LYMPHOCYTES # BLD AUTO: 1.71 10*3/MM3 (ref 0.7–3.1)
LYMPHOCYTES NFR BLD AUTO: 33.7 % (ref 19.6–45.3)
MCH RBC QN AUTO: 28.8 PG (ref 26.6–33)
MCHC RBC AUTO-ENTMCNC: 33.1 G/DL (ref 31.5–35.7)
MCV RBC AUTO: 87.1 FL (ref 79–97)
MONOCYTES # BLD AUTO: 0.51 10*3/MM3 (ref 0.1–0.9)
MONOCYTES NFR BLD AUTO: 10 % (ref 5–12)
NEUTROPHILS NFR BLD AUTO: 2.76 10*3/MM3 (ref 1.7–7)
NEUTROPHILS NFR BLD AUTO: 54.3 % (ref 42.7–76)
NRBC BLD AUTO-RTO: 0 /100 WBC (ref 0–0.2)
PLATELET # BLD AUTO: 189 10*3/MM3 (ref 140–450)
PMV BLD AUTO: 10.1 FL (ref 6–12)
POTASSIUM SERPL-SCNC: 4.1 MMOL/L (ref 3.5–5.2)
RBC # BLD AUTO: 3.71 10*6/MM3 (ref 3.77–5.28)
SARS-COV-2 RNA PNL SPEC NAA+PROBE: NOT DETECTED
SODIUM SERPL-SCNC: 140 MMOL/L (ref 136–145)
WBC NRBC COR # BLD: 5.08 10*3/MM3 (ref 3.4–10.8)

## 2022-02-23 PROCEDURE — 96361 HYDRATE IV INFUSION ADD-ON: CPT

## 2022-02-23 PROCEDURE — 25010000002 IOPAMIDOL 61 % SOLUTION: Performed by: UROLOGY

## 2022-02-23 PROCEDURE — C1758 CATHETER, URETERAL: HCPCS | Performed by: UROLOGY

## 2022-02-23 PROCEDURE — 52356 CYSTO/URETERO W/LITHOTRIPSY: CPT | Performed by: UROLOGY

## 2022-02-23 PROCEDURE — 0 CEFAZOLIN SODIUM-DEXTROSE 2-3 GM-%(50ML) RECONSTITUTED SOLUTION: Performed by: UROLOGY

## 2022-02-23 PROCEDURE — 99244 OFF/OP CNSLTJ NEW/EST MOD 40: CPT | Performed by: UROLOGY

## 2022-02-23 PROCEDURE — G0378 HOSPITAL OBSERVATION PER HR: HCPCS

## 2022-02-23 PROCEDURE — 96376 TX/PRO/DX INJ SAME DRUG ADON: CPT

## 2022-02-23 PROCEDURE — C1769 GUIDE WIRE: HCPCS | Performed by: UROLOGY

## 2022-02-23 PROCEDURE — 85025 COMPLETE CBC W/AUTO DIFF WBC: CPT | Performed by: INTERNAL MEDICINE

## 2022-02-23 PROCEDURE — 82365 CALCULUS SPECTROSCOPY: CPT | Performed by: UROLOGY

## 2022-02-23 PROCEDURE — 87635 SARS-COV-2 COVID-19 AMP PRB: CPT | Performed by: FAMILY MEDICINE

## 2022-02-23 PROCEDURE — 25010000002 MIDAZOLAM PER 1MG: Performed by: NURSE ANESTHETIST, CERTIFIED REGISTERED

## 2022-02-23 PROCEDURE — 99204 OFFICE O/P NEW MOD 45 MIN: CPT | Performed by: INTERNAL MEDICINE

## 2022-02-23 PROCEDURE — 80048 BASIC METABOLIC PNL TOTAL CA: CPT | Performed by: INTERNAL MEDICINE

## 2022-02-23 PROCEDURE — 25010000002 PROPOFOL 200 MG/20ML EMULSION: Performed by: NURSE ANESTHETIST, CERTIFIED REGISTERED

## 2022-02-23 PROCEDURE — 25010000002 FENTANYL CITRATE (PF) 100 MCG/2ML SOLUTION: Performed by: NURSE ANESTHETIST, CERTIFIED REGISTERED

## 2022-02-23 PROCEDURE — 25010000002 KETOROLAC TROMETHAMINE PER 15 MG: Performed by: INTERNAL MEDICINE

## 2022-02-23 PROCEDURE — C2617 STENT, NON-COR, TEM W/O DEL: HCPCS | Performed by: UROLOGY

## 2022-02-23 DEVICE — URETERAL STENT
Type: IMPLANTABLE DEVICE | Site: URETER | Status: FUNCTIONAL
Brand: CONTOUR™

## 2022-02-23 RX ORDER — MIDAZOLAM HYDROCHLORIDE 2 MG/2ML
INJECTION, SOLUTION INTRAMUSCULAR; INTRAVENOUS AS NEEDED
Status: DISCONTINUED | OUTPATIENT
Start: 2022-02-23 | End: 2022-02-23 | Stop reason: SURG

## 2022-02-23 RX ORDER — DOCUSATE SODIUM 100 MG/1
100 CAPSULE, LIQUID FILLED ORAL 2 TIMES DAILY
Qty: 15 CAPSULE | Refills: 1 | Status: SHIPPED | OUTPATIENT
Start: 2022-02-23

## 2022-02-23 RX ORDER — ATROPA BELLADONNA AND OPIUM 16.2; 3 MG/1; MG/1
SUPPOSITORY RECTAL AS NEEDED
Status: DISCONTINUED | OUTPATIENT
Start: 2022-02-23 | End: 2022-02-23 | Stop reason: HOSPADM

## 2022-02-23 RX ORDER — CEFAZOLIN SODIUM 2 G/50ML
2 SOLUTION INTRAVENOUS ONCE
Status: DISCONTINUED | OUTPATIENT
Start: 2022-02-23 | End: 2022-02-23 | Stop reason: HOSPADM

## 2022-02-23 RX ORDER — PROPOFOL 10 MG/ML
INJECTION, EMULSION INTRAVENOUS AS NEEDED
Status: DISCONTINUED | OUTPATIENT
Start: 2022-02-23 | End: 2022-02-23 | Stop reason: SURG

## 2022-02-23 RX ORDER — OXYCODONE HYDROCHLORIDE 5 MG/1
5 TABLET ORAL EVERY 6 HOURS PRN
Qty: 5 TABLET | Refills: 0 | Status: SHIPPED | OUTPATIENT
Start: 2022-02-23

## 2022-02-23 RX ORDER — MAGNESIUM HYDROXIDE 1200 MG/15ML
LIQUID ORAL AS NEEDED
Status: DISCONTINUED | OUTPATIENT
Start: 2022-02-23 | End: 2022-02-23 | Stop reason: HOSPADM

## 2022-02-23 RX ORDER — TAMSULOSIN HYDROCHLORIDE 0.4 MG/1
1 CAPSULE ORAL NIGHTLY
Qty: 10 CAPSULE | Refills: 0 | Status: SHIPPED | OUTPATIENT
Start: 2022-02-23

## 2022-02-23 RX ORDER — FENTANYL CITRATE 50 UG/ML
INJECTION, SOLUTION INTRAMUSCULAR; INTRAVENOUS AS NEEDED
Status: DISCONTINUED | OUTPATIENT
Start: 2022-02-23 | End: 2022-02-23 | Stop reason: SURG

## 2022-02-23 RX ORDER — KETAMINE HCL IN NACL, ISO-OSM 100MG/10ML
SYRINGE (ML) INJECTION AS NEEDED
Status: DISCONTINUED | OUTPATIENT
Start: 2022-02-23 | End: 2022-02-23 | Stop reason: SURG

## 2022-02-23 RX ORDER — ACETAMINOPHEN 500 MG
1000 TABLET ORAL EVERY 6 HOURS
Qty: 30 TABLET | Refills: 0 | Status: SHIPPED | OUTPATIENT
Start: 2022-02-23 | End: 2022-02-26

## 2022-02-23 RX ORDER — LIDOCAINE HYDROCHLORIDE 20 MG/ML
INJECTION, SOLUTION INTRAVENOUS AS NEEDED
Status: DISCONTINUED | OUTPATIENT
Start: 2022-02-23 | End: 2022-02-23 | Stop reason: SURG

## 2022-02-23 RX ORDER — TAMSULOSIN HYDROCHLORIDE 0.4 MG/1
0.4 CAPSULE ORAL DAILY
Status: DISCONTINUED | OUTPATIENT
Start: 2022-02-23 | End: 2022-02-23 | Stop reason: HOSPADM

## 2022-02-23 RX ORDER — ACETAMINOPHEN 325 MG/1
975 TABLET ORAL ONCE
Status: DISCONTINUED | OUTPATIENT
Start: 2022-02-23 | End: 2022-02-23 | Stop reason: HOSPADM

## 2022-02-23 RX ORDER — CEFAZOLIN SODIUM 2 G/50ML
2 SOLUTION INTRAVENOUS ONCE
Status: COMPLETED | OUTPATIENT
Start: 2022-02-23 | End: 2022-02-23

## 2022-02-23 RX ORDER — SODIUM CHLORIDE 9 MG/ML
100 INJECTION, SOLUTION INTRAVENOUS CONTINUOUS
Status: DISCONTINUED | OUTPATIENT
Start: 2022-02-23 | End: 2022-02-23 | Stop reason: HOSPADM

## 2022-02-23 RX ORDER — CEFDINIR 300 MG/1
300 CAPSULE ORAL DAILY
Qty: 3 CAPSULE | Refills: 0 | Status: SHIPPED | OUTPATIENT
Start: 2022-02-23 | End: 2022-02-26

## 2022-02-23 RX ORDER — PHENAZOPYRIDINE HYDROCHLORIDE 100 MG/1
100 TABLET, FILM COATED ORAL 3 TIMES DAILY PRN
Qty: 21 TABLET | Refills: 0 | Status: SHIPPED | OUTPATIENT
Start: 2022-02-23

## 2022-02-23 RX ORDER — ACETAMINOPHEN 325 MG/1
975 TABLET ORAL ONCE
Status: COMPLETED | OUTPATIENT
Start: 2022-02-23 | End: 2022-02-23

## 2022-02-23 RX ADMIN — ACETAMINOPHEN 975 MG: 325 TABLET ORAL at 07:41

## 2022-02-23 RX ADMIN — PROPOFOL 150 MG: 10 INJECTION, EMULSION INTRAVENOUS at 08:35

## 2022-02-23 RX ADMIN — FENTANYL CITRATE 100 MCG: 50 INJECTION INTRAMUSCULAR; INTRAVENOUS at 08:35

## 2022-02-23 RX ADMIN — CEFAZOLIN SODIUM 2 G: 2 SOLUTION INTRAVENOUS at 08:37

## 2022-02-23 RX ADMIN — MIDAZOLAM HYDROCHLORIDE 2 MG: 1 INJECTION, SOLUTION INTRAMUSCULAR; INTRAVENOUS at 08:35

## 2022-02-23 RX ADMIN — SODIUM CHLORIDE 100 ML/HR: 9 INJECTION, SOLUTION INTRAVENOUS at 00:19

## 2022-02-23 RX ADMIN — Medication 25 MG: at 08:39

## 2022-02-23 RX ADMIN — Medication 25 MG: at 08:36

## 2022-02-23 RX ADMIN — SODIUM CHLORIDE 100 ML/HR: 9 INJECTION, SOLUTION INTRAVENOUS at 02:37

## 2022-02-23 RX ADMIN — SODIUM CHLORIDE 100 ML/HR: 9 INJECTION, SOLUTION INTRAVENOUS at 07:41

## 2022-02-23 RX ADMIN — KETOROLAC TROMETHAMINE 30 MG: 30 INJECTION, SOLUTION INTRAMUSCULAR; INTRAVENOUS at 02:40

## 2022-02-23 RX ADMIN — ACETAMINOPHEN 650 MG: 325 TABLET ORAL at 03:31

## 2022-02-23 RX ADMIN — LIDOCAINE HYDROCHLORIDE 60 MG: 20 INJECTION, SOLUTION INTRAVENOUS at 08:35

## 2022-02-23 NOTE — DISCHARGE SUMMARY
[facility]   DISCHARGE SUMMARY      Name:  Veronika Hall   Age:  26 y.o.  Sex:  female  :  1995  MRN:  [id number]   Visit Number:  [id number]    Admission Date:  2022  Date of Discharge:  2022  Primary Care Physician:  Provider, No Known      Discharge Diagnoses:       Ureteral stone with hydronephrosis      Presenting Problem:    Ureteral stone with hydronephrosis [N13.2]     Consults:     Consults     Date and Time Order Name Status Description    2022  3:59 PM Inpatient Urology Consult Completed     2022  2:39 PM Urology (on-call MD unless specified) Completed         Consulting Physician(s)  Chat With All Active Members    Provider Relationship Specialty    John Crowder MD  Consulting Physician Urology          Procedures Performed:    Procedure(s):  CYSTOSCOPY, Right retrograde pyelograms  URETEROSCOPY LASER LITHOTRIPSY WITH STENT placement       Hospital Course:    Patient is a healthy 26-year-old female who presents to the emergency room complaining of worsening right flank pain with radiation into her groin.  Patient is 8 months postpartum and has been having this pain intermittently since prior to giving birth.    On arrival to ER, patient afebrile and hemodynamically stable.  Labs are significant for glucose 125, sodium 136, potassium 3.0, bicarb 20, creatinine 0.62 and BUN 7.  Lactate 3.1.  WBC, H&H and platelets all within normal limits.  Imaging revealed a 10 mm distal right ureteral stone causing severe right hydronephrosis.   Pain controlled with Toradol.  Dr. Crowder was consulted and patient taken for cystoscopy with right ureteral stent placement and lithotripsy on 2022.  Patient tolerated procedure well.  She was discharged with as needed medications for discomfort including oxycodone, Tylenol, Pyridium.  Patient also discharged with a 3-day prescription for cefdinir.  And follow-up with Dr. Crowder arranged for 1 week for stent  removal.  Patient also provided instructions from anesthesia on pumping and dumping as patient is currently breast-feeding.    Vital Signs:    Temp:  [97.7 °F (36.5 °C)-98.4 °F (36.9 °C)] 98 °F (36.7 °C)  Heart Rate:  [51-97] 65  Resp:  [10-18] 16  BP: (102-123)/(57-78) 105/71    Physical Exam:    General Appearance:  Alert and cooperative, not in any acute distress.   Head:  Atraumatic and normocephalic, without obvious abnormality.   Eyes:          PERRLA, conjunctivae and sclerae normal, no icterus. No pallor. Extraocular movements are within normal limits.   Ears:  Ears appear intact with no abnormalities noted.   Throat: No oral lesions, no thrush, oral mucosa moist.   Neck: Supple, trachea midline, no thyromegaly, no carotid bruit.       Lungs:   Chest shape is normal. Breath sounds heard bilaterally equally.  No crackles or wheezing.    Heart:  Normal S1 and S2, no murmur, No JVD.   Abdomen:   Normal bowel sounds, Soft, nontender, nondistended, no guarding, no rebound tenderness.   Extremities: Moves all extremities well, no edema, no cyanosis, no clubbing.   Pulses: Pulses palpable and equal bilaterally.   Skin: No bleeding, bruising or rash.       Neurologic: Alert and oriented x 3. Moves all four limbs equally. No tremors. No facial asymmetry.     Pertinent Lab Results:     Results from last 7 days   Lab Units 02/23/22  0607 02/22/22  1135   SODIUM mmol/L 140 136   POTASSIUM mmol/L 4.1 3.0*   CHLORIDE mmol/L 110* 102   CO2 mmol/L 22.0 19.9*   BUN mg/dL 10 7   CREATININE mg/dL 0.76 0.62   CALCIUM mg/dL 8.3* 9.1   BILIRUBIN mg/dL  --  0.3   ALK PHOS U/L  --  75   ALT (SGPT) U/L  --  9   AST (SGOT) U/L  --  12   GLUCOSE mg/dL 89 125*     Results from last 7 days   Lab Units 02/23/22  0607 02/22/22  1135   WBC 10*3/mm3 5.08 5.59   HEMOGLOBIN g/dL 10.7* 12.1   HEMATOCRIT % 32.3* 34.9   PLATELETS 10*3/mm3 189 224                     Results from last 7 days   Lab Units 02/22/22  1135   LIPASE U/L 26          Results from last 7 days   Lab Units 02/22/22  1210   COLOR UA  Yellow   GLUCOSE UA  Negative   KETONES UA  15 mg/dL (1+)*   LEUKOCYTES UA  Negative   PH, URINE  8.5*   BILIRUBIN UA  Negative   UROBILINOGEN UA  0.2 E.U./dL     Pain Management Panel    There is no flowsheet data to display.             Pertinent Radiology Results:    Imaging Results (All)     Procedure Component Value Units Date/Time    CT Abdomen Pelvis Without Contrast [[id number]] Collected: 02/22/22 1329     Updated: 02/22/22 1331    Narrative:      PROCEDURE: CT ABDOMEN PELVIS WO CONTRAST-     HISTORY: lower abdominal pain     COMPARISON: None .     PROCEDURE: Axial images were obtained from the lung bases through the  pubic symphysis without intravenous contrast.     FINDINGS:      ABDOMEN: The lung bases are clear. The heart size is normal. The limited  noncontrast images of the liver are normal. The spleen is normal. No  adrenal masses are seen.  The pancreas has an unremarkable unenhanced  appearance. The aorta is normal in caliber. There is no significant free  fluid or adenopathy.  There is no nephrolithiasis. There is a 10 mm  distal right ureteral stone causing severe right hydronephrosis. There  is no left hydronephrosis. Limited noncontrast images of the bowel are  unremarkable.     PELVIS: The appendix is normal. The urinary bladder is unremarkable.  There is no significant fluid or adenopathy.       Impression:      10 mm distal right ureteral stone causes severe right  hydronephrosis.              467.35 mGy.cm  9.73 mGy     This study was performed with techniques to keep radiation doses as low  as reasonably achievable (ALARA). Individualized dose reduction  techniques using automated exposure control or adjustment of mA and/or  kV according to the patient size were employed.      This report was signed and finalized on 2/22/2022 1:29 PM by En Martel M.D..          Echo:    Results for orders placed during the hospital  encounter of 06/24/21    Adult Transthoracic Echo Complete w/ Color, Spectral and Contrast if Necessary Per Protocol    Interpretation Summary  · Estimated left ventricular EF = 58% Left ventricular ejection fraction appears to be 56 - 60%. Left ventricular systolic function is normal.  · Left ventricular diastolic function was normal.      Condition on Discharge:      Stable.    Code status during the hospital stay:    Code Status and Medical Interventions:   Ordered at: 02/22/22 1506     Code Status (Patient has no pulse and is not breathing):    CPR (Attempt to Resuscitate)     Medical Interventions (Patient has pulse or is breathing):    Full Support       Discharge Disposition:    Home or Self Care    Discharge Medications:       Discharge Medications      New Medications      Instructions Start Date   acetaminophen 500 MG tablet  Commonly known as: TYLENOL   Take 2 tablets by mouth Every 6 (Six) Hours      cefdinir 300 MG capsule  Commonly known as: OMNICEF   300 mg, Oral, Daily      docusate sodium 100 MG capsule  Commonly known as: Colace   Take 1 capsule by mouth 2 (Two) Times a Day If taking oxycodone      oxyCODONE 5 MG immediate release tablet  Commonly known as: Roxicodone   Take 1 tablet by mouth Every 6 (Six) Hours As Needed for Moderate or Severe Pain .      phenazopyridine 100 MG tablet  Commonly known as: PYRIDIUM   Take 1 tablet by mouth 3 (Three) Times a Day As Needed for urinary burning      tamsulosin 0.4 MG capsule 24 hr capsule  Commonly known as: FLOMAX   0.4 mg, Oral, Nightly         Continue These Medications      Instructions Start Date   prenatal vitamin 27-0.8 27-0.8 MG tablet tablet   Oral, Daily      sertraline 50 MG tablet  Commonly known as: ZOLOFT   50 mg, Oral, Daily             Discharge Diet:     Diet Instructions     Diet: Regular      Discharge Diet: Regular          Activity at Discharge:         Follow-up Appointments:    Additional Instructions for the Follow-ups that You  Need to Schedule     Discharge Follow-up with Specified Provider: Alejandro 1 Week   As directed      To: Vel    Follow Up: 1 Week    Follow Up Details: stent removal            Follow-up Information     Provider, No Known .    Contact information:  [facility] [id number]                         No future appointments.    Additional Instructions for the Follow-ups that You Need to Schedule     Discharge Follow-up with Specified Provider: Vel; 1 Week   As directed      To: Vel    Follow Up: 1 Week    Follow Up Details: stent removal               Test Results Pending at Discharge:    Pending Labs     Order Current Status    STONE ANALYSIS - Calculus, Kidney, Right In process             Jaylen Kahn DO  02/23/22  17:37 EST    Time spent: Time: I spent  >30  minutes on this discharge activity which included: face-to-face encounter with the patient, reviewing the data in the system, coordination of the care with the nursing staff as well as consultants, documentation, and entering orders.        Dictated utilizing Dragon dictation.

## 2022-02-23 NOTE — CASE MANAGEMENT/SOCIAL WORK
Case Management Discharge Note              Selected Continued Care - Discharged on 2/23/2022 Admission date: 2/22/2022 - Discharge disposition: Home or Self Care      Janie Mccoy

## 2022-02-23 NOTE — ANESTHESIA POSTPROCEDURE EVALUATION
Patient: Veronika Hall    Procedure Summary     Date: 02/23/22 Room / Location: [facility]  [facility] OR    Anesthesia Start: 0833 Anesthesia Stop: 0940    Procedure: CYSTOSCOPY, Right retrograde pyelograms  URETEROSCOPY LASER LITHOTRIPSY WITH STENT placement (Right ) Diagnosis:       Ureteral stone with hydronephrosis      (Ureteral stone with hydronephrosis [N13.2])    Surgeons: John Crowder MD Provider: Zeyad Linn CRNA    Anesthesia Type: general ASA Status: 2          Anesthesia Type: general    Vitals  Vitals Value Taken Time   /78 02/23/22 0938   Temp     Pulse 84 02/23/22 0940   Resp     SpO2 100 % 02/23/22 0940   Vitals shown include unvalidated device data.        Post Anesthesia Care and Evaluation    Patient location during evaluation: PACU  Patient participation: complete - patient participated  Level of consciousness: awake and alert and sleepy but conscious  Pain score: 2  Pain management: adequate  Airway patency: patent  Anesthetic complications: No anesthetic complications  PONV Status: none  Cardiovascular status: acceptable  Respiratory status: acceptable and face mask  Hydration status: acceptable

## 2022-02-23 NOTE — SIGNIFICANT NOTE
Patient is being transferred to post-partum unit (Room # 207).  Patient report called to Nelia JACOBSEN RN.

## 2022-02-23 NOTE — PLAN OF CARE
Goal Outcome Evaluation:  Plan of Care Reviewed With: patient        Progress: improving  Outcome Summary: VSS. Pain adequately controlled. Voiding. Ready for discharge.

## 2022-02-23 NOTE — PLAN OF CARE
Goal Outcome Evaluation:              Outcome Summary: VSS.  Oxygenation maintained on room air.  Patient alert and oriented x4.  Complaint of pain controlled with ordered PRN medication (see MAR).  IV fluids administered as ordered (see MAR).  Patient has been NPO since midnight.  No acute events noted during shift.  Patient is to be transferred to post-partum unit.

## 2022-02-23 NOTE — ANESTHESIA PROCEDURE NOTES
Airway  Urgency: elective    Date/Time: 2/23/2022 8:33 AM  Airway not difficult    General Information and Staff    Patient location during procedure: OR  CRNA: Zeyad Linn CRNA    Indications and Patient Condition  Indications for airway management: CNS depression    Preoxygenated: yes  Mask difficulty assessment: 1 - vent by mask    Final Airway Details  Final airway type: supraglottic airway      Successful airway: classic  Size 4    Number of attempts at approach: 1  Assessment: lips, teeth, and gum same as pre-op and atraumatic intubation

## 2022-02-23 NOTE — SIGNIFICANT NOTE
Covid-19 test not performed when patient was transferred to unit on 02/21/2022 @ 1530.  Patient was last tested for Covid-19 on 02/16/2022 during emergency room visit.  Covid-19 test order has been replaced on 02/23/2022 @ 0023 Specimen will be collected and sent to lab.

## 2022-02-23 NOTE — CASE MANAGEMENT/SOCIAL WORK
Discharge Planning Assessment  [facility]     Patient Name: Veronika Hall  MRN: [id number]  Today's Date: 2/23/2022    Admit Date: 2/22/2022     went to pt's room to complete DCP. Pt was gone to another floor for a procedure.  will attempt later this afternoon.     Janie Mccoy

## 2022-02-23 NOTE — CONSULTS
Reason for Consult  Nephrolithiasis    Consulting physician  Jaylen Kahn DO    HPI  Ms. Hall is a 26 y.o. female with past medical history of anxiety and recent pregnancy 6 months ago, who presents with large obstructing right distal ureteral stone and flank pain.    Patient presented to the ER last night with several month history of dull intermittent colicky right side and flank pain, which had progressed to severe intense constant right flank pain radiating around her right side, not associated with fevers, gross hematuria, or dysuria.  She has had associated nausea.    She is currently breast-feeding and delivered 6 months ago.    Past Medical History  Past Medical History:   Diagnosis Date   • Anemia 2020   • Anxiety    • Disease of thyroid gland 2018    Pre Hashimoto's, nodules   • Fibroid 2019    Fibroadanoma-left breast   • Urinary tract infection        Past Surgical History  Past Surgical History:   Procedure Laterality Date   • BREAST BIOPSY  2019    Benign fibroadanoma   • CLAVICLE SURGERY      AS A CHILD   • EYE SURGERY Left     AS A CHILD   • WISDOM TOOTH EXTRACTION  2017       Medications    Current Facility-Administered Medications:   •  acetaminophen (TYLENOL) tablet 650 mg, 650 mg, Oral, Q4H PRN, 650 mg at 02/23/22 0331 **OR** acetaminophen (TYLENOL) 160 MG/5ML solution 650 mg, 650 mg, Oral, Q4H PRN **OR** acetaminophen (TYLENOL) suppository 650 mg, 650 mg, Rectal, Q4H PRN, Jaylen Kahn DO  •  ceFAZolin Sodium-Dextrose (ANCEF) IVPB (duplex) 2 g, 2 g, Intravenous, Once, John Crowder MD  •  enoxaparin (LOVENOX) syringe 40 mg, 40 mg, Subcutaneous, Q24H, Jaylen Kahn DO  •  ketorolac (TORADOL) injection 30 mg, 30 mg, Intravenous, Q6H PRN, Jaylen Kahn DO, 30 mg at 02/23/22 0240  •  ondansetron (ZOFRAN) injection 4 mg, 4 mg, Intravenous, Q6H PRN, Jaylen Kahn DO  •  [MAR Hold] sodium chloride 0.9 % flush 10 mL, 10 mL, Intravenous, PRN, Rafael Oakes, DO  •  sodium  chloride 0.9 % flush 10 mL, 10 mL, Intravenous, Q12H, Jaylen Kahn DO, 10 mL at 02/22/22 2044  •  sodium chloride 0.9 % flush 10 mL, 10 mL, Intravenous, PRN, Jaylen Kahn DO  •  sodium chloride 0.9 % infusion, 100 mL/hr, Intravenous, Continuous, Jaylen Kahn DO, Last Rate: 100 mL/hr at 02/23/22 0514, 100 mL/hr at 02/23/22 0514  •  sodium chloride 0.9 % infusion, 100 mL/hr, Intravenous, Continuous, John Crowder MD, Last Rate: 100 mL/hr at 02/23/22 0741, 100 mL/hr at 02/23/22 0741    Allergies  Allergies   Allergen Reactions   • Sulfa Antibiotics Hives       Social History  Social History     Social History Narrative   • Not on file       Family History  Family History   Problem Relation Age of Onset   • Hashimoto's thyroiditis Mother        Review of Systems  Constitutional: No fevers   Skin: Negative for rash  Endocrine: No heat/cold intolerance   Cardiovascular: Negative for chest pain   Respiratory: Negative for shortness of breath   Gastrointestinal: No constipation, nausea or vomiting  Genitourinary: Negative for new lower urinary tract symptoms, gross hematuria or dysuria.  Musculoskeletal: Positive right flank pain  Neurological:  Negative for frequent headaches or dizziness  Lymph/Heme: Negative for leg swelling or calf pain.    Physical Exam  Vitals:    02/22/22 1933 02/22/22 2357 02/23/22 0300 02/23/22 0600   BP: 114/66 107/64 114/71 102/65   BP Location: Left arm Left arm     Patient Position: Lying Lying Lying    Pulse: 68 62 72 72   Resp: 16 16 18 18   Temp: 97.7 °F (36.5 °C) 97.9 °F (36.6 °C) 98.3 °F (36.8 °C) 98.4 °F (36.9 °C)   TempSrc: Oral Oral Oral Oral   SpO2:  98% 99% 99%   Weight:       Height:         Constitutional: NAD, WDWN  HEENT: NCAT. Conjunctivae normal  MMM  Cardiovascular: Regular rate  Pulmonary/Chest: Respirations are even and nonlabored bilaterally  Abdominal: Soft with no distension, tenderness, masses, guarding or CVA tenderness  Neurological: A + O, cranial  nerves II-XII grossly intact,   Extremities: JAC x 4, warm, no clubbing, no cyanosis  Skin: Pink, warm, dry with no rash  Psychiatric:  Normal mood and affect      I/O last 3 completed shifts:  In: 3900.7 [P.O.:840; I.V.:3060.7]  Out: 800 [Urine:800]    Labs  Lab Results   Component Value Date    GLUCOSE 89 02/23/2022    CALCIUM 8.3 (L) 02/23/2022     02/23/2022    K 4.1 02/23/2022    CO2 22.0 02/23/2022     (H) 02/23/2022    BUN 10 02/23/2022    CREATININE 0.76 02/23/2022    EGFRIFNONA 91 02/23/2022    BCR 13.2 02/23/2022    ANIONGAP 8.0 02/23/2022       Lab Results   Component Value Date    WBC 5.08 02/23/2022    HGB 10.7 (L) 02/23/2022    HCT 32.3 (L) 02/23/2022    MCV 87.1 02/23/2022     02/23/2022       Brief Urine Lab Results  (Last result in the past 365 days)      Color   Clarity   Blood   Leuk Est   Nitrite   Protein   CREAT   Urine HCG        02/22/22 1210 Yellow   Clear   Small (1+)   Negative   Negative   Negative                 No results found for: URINECX    Radiographic Studies  CT Abdomen Pelvis Without Contrast    Result Date: 2/22/2022  PROCEDURE: CT ABDOMEN PELVIS WO CONTRAST-  HISTORY: lower abdominal pain  COMPARISON: None .  PROCEDURE: Axial images were obtained from the lung bases through the pubic symphysis without intravenous contrast.  FINDINGS:  ABDOMEN: The lung bases are clear. The heart size is normal. The limited noncontrast images of the liver are normal. The spleen is normal. No adrenal masses are seen.  The pancreas has an unremarkable unenhanced appearance. The aorta is normal in caliber. There is no significant free fluid or adenopathy.  There is no nephrolithiasis. There is a 10 mm distal right ureteral stone causing severe right hydronephrosis. There is no left hydronephrosis. Limited noncontrast images of the bowel are unremarkable.  PELVIS: The appendix is normal. The urinary bladder is unremarkable. There is no significant fluid or adenopathy.      10 mm  distal right ureteral stone causes severe right hydronephrosis.     467.35 mGy.cm 9.73 mGy  This study was performed with techniques to keep radiation doses as low as reasonably achievable (ALARA). Individualized dose reduction techniques using automated exposure control or adjustment of mA and/or kV according to the patient size were employed.  This report was signed and finalized on 2/22/2022 1:29 PM by En Martel M.D..      I have personally reviewed these labs and imaging.     Assessment  Ms. Hall is a 26 y.o. female with obstructing new large right distal ureteral stone and severe hydronephrosis with uncontrolled flank pain.     Plan  1.  OR today for right ureteroscopy laser lithotripsy with stent insertion  2.  She can be discharged later today.  I will follow up with her in 1 week to remove the stent  3.  I will write her postoperative medications      John Crowder MD

## 2022-02-23 NOTE — ANESTHESIA PREPROCEDURE EVALUATION
Anesthesia Evaluation     Patient summary reviewed and Nursing notes reviewed   no history of anesthetic complications:  NPO Solid Status: > 8 hours  NPO Liquid Status: > 8 hours           Airway   Mallampati: I  TM distance: >3 FB  Neck ROM: full  no difficulty expected  Dental - normal exam     Pulmonary - negative pulmonary ROS and normal exam   Cardiovascular - normal exam    (+) valvular problems/murmurs,       Neuro/Psych  (+) psychiatric history,    GI/Hepatic/Renal/Endo    (+)   renal disease, thyroid problem     Musculoskeletal (-) negative ROS    Abdominal    Substance History - negative use     OB/GYN negative ob/gyn ROS         Other - negative ROS                       Anesthesia Plan    ASA 2     general     intravenous induction     Anesthetic plan, all risks, benefits, and alternatives have been provided, discussed and informed consent has been obtained with: patient.        CODE STATUS:    Code Status (Patient has no pulse and is not breathing): CPR (Attempt to Resuscitate)  Medical Interventions (Patient has pulse or is breathing): Full Support

## 2022-02-23 NOTE — DISCHARGE INSTRUCTIONS
Home Care After Ureteroscopy and Laser Lithotripsy  The following instructions will help you care for yourself, or be cared for upon your return home today.    These are guidelines for your care right after surgery only.     Diet  Drink plenty of liquids and eat light meals today.    Start your regular diet tomorrow.    Activity  Start normal activities in twenty-four (24) hours.    Wound Care and Hygiene  No restrictions, start normal routine.    Anesthesia Precautions & Expectations  After anesthesia, rest for 24 hours.    Do not drive, drink alcoholic beverages or make any important decisions during this time.  General anesthesia may cause a sore throat, jaw discomfort or muscle aches.    These symptoms can last for one or two days.     What to Expect after Surgery  Mild pain with voiding.  Frequency or urgency.  Bladder cramps.  Minimal bleeding with voiding.    Call your Doctor  Passing clots in urine preventing bladder emptying  Severe pain not controlled by oral medication  Temperature above 101.5 degrees  Inability to urinate within eight (8) hours after surgery    After Stent Placement  It is common to have blood tinged urine for 3-5 days.  It is common to have pain in your side and in your back when you urinate for 3-5 days.  It is common to have urgency with urination.  This is a temporary stent and will need to be removed in the office in 1 week.  Do not take the Pyridium 24 hours prior to your stent removal.    Other Contacts  Urology Office:  [id number]   [place] [address] [phone] office  [phone] fax    Other Instructions  Take tamsulosin daily until the stent comes out.  If you take the oxycodone, dump your milk after.  Take Pyridium up to 3 times daily as needed for burning with urination.   Take Tylenol scheduled every 6 hours for the first 3 days.  Take the oxycodone on top of that as needed.  Take all of the antibiotics.     Follow up Appointment  1 week for  stent removal. Please See After visit Summary. Call if you do not have an appt already scheduled.

## 2022-02-23 NOTE — OP NOTE
Preoperative diagnosis  right ureteral stone    Postoperative diagnosis  right ureteral stone    Procedure performed  1.  Flexible cystoscopy  2.  right retrograde pyelogram  3.  right ureteroscopy with holmium-YAG laser lithotripsy and basket extraction of stone fragments (96314)  4.  right ureteral stent placement 6 Fr x 26 cm  5.  Fluoroscopy time < 1 hour    Surgeon  John Crowder MD    Anesthesia  General    Complications  None    Specimen  Stone fragments for biochemical analysis    Findings  Cystoscopy revealed no tumors, stones or other mucosal abnormalities.  Retrograde pyelogram revealed a delicate system with identification of the stones seen on imaging.    Ureteroscopy revealed a large distal stone and very tortuous right ureter. Multiple manipulations required to place stent.     Indications  26 y.o. female with a history of obstructing distal left ureteral stone agreed to undergo the above named procedure after discussion of the alternatives, risks and benefits. Informed consent was obtained.      Procedure  The patient was taken to the operating room and placed supine on the operating table.  Pre-operative antibiotics were administered.  Bilateral lower extremity SCDs were placed.  After induction of general anesthesia the patient was positioned in dorsal lithotomy, prepped and draped in a sterile fashion.  A time-out was performed.      A 14 Armenian flexible cystoscope was passed carefully via urethra into the bladder.  The right ureteral orifice was identified and a Sensor wire was passed retrograde to the level of the kidney and confirmed by fluoroscopy.  The flexible scope was off-loaded and the bladder emptied with a straight catheter.  An 8-10 coaxial dilator was passed without difficulty and then removed.  The semirigid ureteroscope was passed carefully along the Sensor wire through the urethra and into the distal ureter.  The stone was encountered and fragmented with a 200-micron holmium YAG  laser fiber at laser settings of 0.2 Joules and a frequency of 50 Hz.  The fragments were basket extracted.  At the completion of the procedure, all clinically significant fragments were removed from the ureter and only dust-like fragments remained.  The ureteroscope was withdrawn.  A 5-St Helenian open-ended was passed over the wire and the wire removed.  A retrograde pyelogram was performed by slowly injecting 5 mL of 50% Omnipaque contrast via the 5 St Helenian catheter with findings described above.  An Amplatz super-stiff was placed to the level of the renal pelvis confirmed by fluoroscopy.  A 6 Fr x 26 cm stent was positioned with the upper end in the upper pole and the lower in the bladder confirmed by fluoroscopy. This required usage of multiple torque catheters and different wires. The bladder was emptied and the procedure was complete. The patient tolerated the procedure well and was stable throughout.    The patient will follow up with me next week for stent removal.

## 2022-02-24 NOTE — CASE MANAGEMENT/SOCIAL WORK
Case Management Discharge Note                Selected Continued Care - Discharged on 2/23/2022 Admission date: 2/22/2022 - Discharge disposition: Home or Self Care    Destination    No services have been selected for the patient.              Durable Medical Equipment    No services have been selected for the patient.              Dialysis/Infusion    No services have been selected for the patient.              Home Medical Care    No services have been selected for the patient.              Therapy    No services have been selected for the patient.              Community Resources    No services have been selected for the patient.              Community & DME    No services have been selected for the patient.                  Transportation Services  Private: Car    Final Discharge Disposition Code: 01 - home or self-care

## 2022-02-28 ENCOUNTER — TELEPHONE (OUTPATIENT)
Dept: UROLOGY | Facility: CLINIC | Age: 27
End: 2022-02-28

## 2022-02-28 NOTE — TELEPHONE ENCOUNTER
Caller: MALLY LOPEZ    Relationship to patient: SELF    Best call back number: [phone]    Patient is needing: PT CALLED TO SCHEDULE FUP FOR STENT REMOVAL THIS WEEK PER DR. YAP. HUB TRIED TO WARM TX BUT PLACED ON HOLD FOR OVER FIVE MINUTES AFTER EXPLAINING TO ANANTH WHAT WAS NEEDED.     HUB UNABLE TO WARM TX.

## 2022-03-01 LAB
CALCIUM OXALATE DIHYDRATE MFR STONE IR: 60 %
COLOR STONE: NORMAL
COM MFR STONE: 10 %
COMPN STONE: NORMAL
HYDROXYAPATITE 24H ENGDIFF UR: 30 %
LABORATORY COMMENT REPORT: NORMAL
LABORATORY COMMENT REPORT: NORMAL
Lab: NORMAL
Lab: NORMAL
PHOTO: NORMAL
SIZE STONE: NORMAL MM
SPEC SOURCE SUBJ: NORMAL
WT STONE: 12 MG

## 2022-03-02 ENCOUNTER — OFFICE VISIT (OUTPATIENT)
Dept: UROLOGY | Facility: CLINIC | Age: 27
End: 2022-03-02

## 2022-03-02 DIAGNOSIS — N13.2 URETERAL STONE WITH HYDRONEPHROSIS: Primary | ICD-10-CM

## 2022-03-02 PROCEDURE — 52310 CYSTOSCOPY AND TREATMENT: CPT | Performed by: UROLOGY

## 2022-03-02 NOTE — PROGRESS NOTES
Preoperative diagnosis  Foreign body in genitourinary tract    Postoperative diagnosis  Foreign body in genitourinary tract    Procedure  Flexible cystourethroscopy with stent removal    Attending surgeon  John Crowder MD    Anesthesia  2% lidocaine jelly intraurethrally    Complications  None    Indications  26 y.o. female who is status post ureteroscopy with laser lithotripsy who presents for stent removal.    Procedure  Detailed information of all possible complications and side effects were discussed with the patient.  Informed consent was obtained. Patient was given one dose of antibiotics. The patient was placed in supine position and a timeout was performed. The patient was prepped and draped in sterile fashion.  Next, 2% lidocaine jelly was bluntly injected per urethra without difficulty. The 14 Greenlandic flexible cystoscope was passed through the urethra and into the bladder.  The stent was visualized, grasped and removed in its entirety.  The patient tolerated the procedure well.    Plan  1. Provided education regarding water intake of at least 2 liters per day  2. F/u in 8 weeks with a renal ultrasound  3. [facility]

## 2022-05-02 ENCOUNTER — TELEPHONE (OUTPATIENT)
Dept: UROLOGY | Facility: CLINIC | Age: 27
End: 2022-05-02

## 2022-05-02 NOTE — TELEPHONE ENCOUNTER
Caller: Mally Hall    Relationship: Self    Best call back number: [phone]    What is the best time to reach you: ANY    Who are you requesting to speak with (clinical staff, provider,  specific staff member): ANY    Do you know the name of the person who called: MALLY MONTANO    What was the call regarding: PT NEEDS TO KNOW NAME OF LAB TO TAKE URINE TEST FOR MAIL ORDER URINE TEST AND NEEDS TO RESCHEDULE APPT. WITH DR. YAP    Do you require a callback: YES

## 2022-05-03 NOTE — TELEPHONE ENCOUNTER
Please reschedule pt for approx one month after she completes renal us and 24 hr urine. I am resending [facility] order. I called and left vm

## 2022-05-04 ENCOUNTER — TELEPHONE (OUTPATIENT)
Dept: UROLOGY | Facility: CLINIC | Age: 27
End: 2022-05-04

## 2022-05-04 NOTE — TELEPHONE ENCOUNTER
Called pt to reschedule her 6 MO FU W/COLT/JUAN A.    Sent US referral to [facility] for scheduling. Sent Juan A order via fax.

## 2022-05-23 ENCOUNTER — HOSPITAL ENCOUNTER (OUTPATIENT)
Dept: ULTRASOUND IMAGING | Facility: HOSPITAL | Age: 27
Discharge: HOME | End: 2022-05-23
Admitting: UROLOGY

## 2022-05-23 DIAGNOSIS — N13.2 URETERAL STONE WITH HYDRONEPHROSIS: ICD-10-CM

## 2022-05-23 PROCEDURE — 76775 US EXAM ABDO BACK WALL LIM: CPT

## 2023-03-27 ENCOUNTER — LAB (OUTPATIENT)
Dept: LAB | Facility: HOSPITAL | Age: 28
End: 2023-03-27
Payer: OTHER GOVERNMENT

## 2023-03-27 ENCOUNTER — TRANSCRIBE ORDERS (OUTPATIENT)
Dept: LAB | Facility: HOSPITAL | Age: 28
End: 2023-03-27
Payer: OTHER GOVERNMENT

## 2023-03-27 DIAGNOSIS — Z34.01 ENCOUNTER FOR SUPERVISION OF NORMAL FIRST PREGNANCY IN FIRST TRIMESTER: ICD-10-CM

## 2023-03-27 DIAGNOSIS — Z34.81 SUPERVISION OF NORMAL INTRAUTERINE PREGNANCY IN MULTIGRAVIDA, FIRST TRIMESTER: ICD-10-CM

## 2023-03-27 DIAGNOSIS — Z34.81 SUPERVISION OF NORMAL INTRAUTERINE PREGNANCY IN MULTIGRAVIDA, FIRST TRIMESTER: Primary | ICD-10-CM

## 2023-03-27 LAB
ABO GROUP BLD: NORMAL
AMPHET+METHAMPHET UR QL: NEGATIVE
AMPHETAMINES UR QL: NEGATIVE
BARBITURATES UR QL SCN: NEGATIVE
BASOPHILS # BLD AUTO: 0.03 10*3/MM3 (ref 0–0.2)
BASOPHILS NFR BLD AUTO: 0.6 % (ref 0–1.5)
BENZODIAZ UR QL SCN: NEGATIVE
BUPRENORPHINE SERPL-MCNC: NEGATIVE NG/ML
CANNABINOIDS SERPL QL: NEGATIVE
COCAINE UR QL: NEGATIVE
DEPRECATED RDW RBC AUTO: 45.3 FL (ref 37–54)
EOSINOPHIL # BLD AUTO: 0.07 10*3/MM3 (ref 0–0.4)
EOSINOPHIL NFR BLD AUTO: 1.4 % (ref 0.3–6.2)
ERYTHROCYTE [DISTWIDTH] IN BLOOD BY AUTOMATED COUNT: 15.1 % (ref 12.3–15.4)
HBV SURFACE AG SERPL QL IA: NORMAL
HCT VFR BLD AUTO: 34.4 % (ref 34–46.6)
HCV AB SER DONR QL: NORMAL
HGB BLD-MCNC: 11.6 G/DL (ref 12–15.9)
HIV1 P24 AG SER QL: NORMAL
HIV1+2 AB SER QL: NORMAL
IMM GRANULOCYTES # BLD AUTO: 0.02 10*3/MM3 (ref 0–0.05)
IMM GRANULOCYTES NFR BLD AUTO: 0.4 % (ref 0–0.5)
LYMPHOCYTES # BLD AUTO: 1.75 10*3/MM3 (ref 0.7–3.1)
LYMPHOCYTES NFR BLD AUTO: 33.8 % (ref 19.6–45.3)
MCH RBC QN AUTO: 28.4 PG (ref 26.6–33)
MCHC RBC AUTO-ENTMCNC: 33.7 G/DL (ref 31.5–35.7)
MCV RBC AUTO: 84.1 FL (ref 79–97)
METHADONE UR QL SCN: NEGATIVE
MONOCYTES # BLD AUTO: 0.39 10*3/MM3 (ref 0.1–0.9)
MONOCYTES NFR BLD AUTO: 7.5 % (ref 5–12)
NEUTROPHILS NFR BLD AUTO: 2.91 10*3/MM3 (ref 1.7–7)
NEUTROPHILS NFR BLD AUTO: 56.3 % (ref 42.7–76)
NRBC BLD AUTO-RTO: 0 /100 WBC (ref 0–0.2)
OPIATES UR QL: NEGATIVE
OXYCODONE UR QL SCN: NEGATIVE
PCP UR QL SCN: NEGATIVE
PLATELET # BLD AUTO: 213 10*3/MM3 (ref 140–450)
PMV BLD AUTO: 10.8 FL (ref 6–12)
PROPOXYPH UR QL: NEGATIVE
RBC # BLD AUTO: 4.09 10*6/MM3 (ref 3.77–5.28)
RH BLD: POSITIVE
TRICYCLICS UR QL SCN: NEGATIVE
WBC NRBC COR # BLD: 5.17 10*3/MM3 (ref 3.4–10.8)

## 2023-03-27 PROCEDURE — 80306 DRUG TEST PRSMV INSTRMNT: CPT

## 2023-03-27 PROCEDURE — G0475 HIV COMBINATION ASSAY: HCPCS

## 2023-03-27 PROCEDURE — 87491 CHLMYD TRACH DNA AMP PROBE: CPT

## 2023-03-27 PROCEDURE — 85025 COMPLETE CBC W/AUTO DIFF WBC: CPT

## 2023-03-27 PROCEDURE — 86762 RUBELLA ANTIBODY: CPT

## 2023-03-27 PROCEDURE — 86900 BLOOD TYPING SEROLOGIC ABO: CPT

## 2023-03-27 PROCEDURE — 86901 BLOOD TYPING SEROLOGIC RH(D): CPT

## 2023-03-27 PROCEDURE — 87340 HEPATITIS B SURFACE AG IA: CPT

## 2023-03-27 PROCEDURE — 87661 TRICHOMONAS VAGINALIS AMPLIF: CPT

## 2023-03-27 PROCEDURE — 86803 HEPATITIS C AB TEST: CPT

## 2023-03-27 PROCEDURE — 87086 URINE CULTURE/COLONY COUNT: CPT

## 2023-03-27 PROCEDURE — 36415 COLL VENOUS BLD VENIPUNCTURE: CPT

## 2023-03-27 PROCEDURE — 86592 SYPHILIS TEST NON-TREP QUAL: CPT

## 2023-03-27 PROCEDURE — 87591 N.GONORRHOEAE DNA AMP PROB: CPT

## 2023-03-28 LAB
BACTERIA SPEC AEROBE CULT: NO GROWTH
RPR SER QL: NORMAL
RUBV IGG SERPL IA-ACNC: 2.1 INDEX

## 2023-03-29 LAB
C TRACH RRNA SPEC QL NAA+PROBE: NEGATIVE
N GONORRHOEA RRNA SPEC QL NAA+PROBE: NEGATIVE
T VAGINALIS RRNA SPEC QL NAA+PROBE: NEGATIVE

## 2023-10-05 ENCOUNTER — TRANSCRIBE ORDERS (OUTPATIENT)
Dept: LAB | Facility: HOSPITAL | Age: 28
End: 2023-10-05
Payer: OTHER GOVERNMENT

## 2023-10-05 ENCOUNTER — LAB (OUTPATIENT)
Dept: LAB | Facility: HOSPITAL | Age: 28
End: 2023-10-05
Payer: OTHER GOVERNMENT

## 2023-10-05 DIAGNOSIS — Z3A.37 37 WEEKS GESTATION OF PREGNANCY: Primary | ICD-10-CM

## 2023-10-05 DIAGNOSIS — Z3A.37 37 WEEKS GESTATION OF PREGNANCY: ICD-10-CM

## 2023-10-05 LAB — EXTERNAL GROUP B STREP ANTIGEN: NEGATIVE

## 2023-10-05 PROCEDURE — 87086 URINE CULTURE/COLONY COUNT: CPT

## 2023-10-06 LAB — BACTERIA SPEC AEROBE CULT: NO GROWTH

## 2023-10-31 ENCOUNTER — PREP FOR SURGERY (OUTPATIENT)
Dept: OTHER | Facility: HOSPITAL | Age: 28
End: 2023-10-31
Payer: OTHER GOVERNMENT

## 2023-10-31 DIAGNOSIS — Z34.90 TERM PREGNANCY: Primary | ICD-10-CM

## 2023-10-31 RX ORDER — PROMETHAZINE HYDROCHLORIDE 12.5 MG/1
12.5 TABLET ORAL EVERY 6 HOURS PRN
Status: CANCELLED | OUTPATIENT
Start: 2023-10-31

## 2023-10-31 RX ORDER — CARBOPROST TROMETHAMINE 250 UG/ML
250 INJECTION, SOLUTION INTRAMUSCULAR AS NEEDED
Status: CANCELLED | OUTPATIENT
Start: 2023-10-31

## 2023-10-31 RX ORDER — MORPHINE SULFATE 1 MG/ML
1 INJECTION, SOLUTION EPIDURAL; INTRATHECAL; INTRAVENOUS EVERY 4 HOURS PRN
Status: CANCELLED | OUTPATIENT
Start: 2023-10-31 | End: 2023-11-07

## 2023-10-31 RX ORDER — OXYTOCIN/0.9 % SODIUM CHLORIDE 30/500 ML
30 PLASTIC BAG, INJECTION (ML) INTRAVENOUS ONCE
Status: CANCELLED | OUTPATIENT
Start: 2023-10-31 | End: 2023-10-31

## 2023-10-31 RX ORDER — ONDANSETRON 4 MG/1
4 TABLET, FILM COATED ORAL EVERY 6 HOURS PRN
Status: CANCELLED | OUTPATIENT
Start: 2023-10-31

## 2023-10-31 RX ORDER — MAGNESIUM CARB/ALUMINUM HYDROX 105-160MG
30 TABLET,CHEWABLE ORAL ONCE
Status: CANCELLED | OUTPATIENT
Start: 2023-10-31 | End: 2023-10-31

## 2023-10-31 RX ORDER — ONDANSETRON 2 MG/ML
4 INJECTION INTRAMUSCULAR; INTRAVENOUS EVERY 6 HOURS PRN
Status: CANCELLED | OUTPATIENT
Start: 2023-10-31

## 2023-10-31 RX ORDER — OXYTOCIN/0.9 % SODIUM CHLORIDE 30/500 ML
30 PLASTIC BAG, INJECTION (ML) INTRAVENOUS CONTINUOUS
Status: CANCELLED | OUTPATIENT
Start: 2023-10-31 | End: 2023-10-31

## 2023-10-31 RX ORDER — PROMETHAZINE HYDROCHLORIDE 12.5 MG/1
12.5 SUPPOSITORY RECTAL EVERY 6 HOURS PRN
Status: CANCELLED | OUTPATIENT
Start: 2023-10-31

## 2023-10-31 RX ORDER — LIDOCAINE HYDROCHLORIDE 10 MG/ML
0.5 INJECTION, SOLUTION EPIDURAL; INFILTRATION; INTRACAUDAL; PERINEURAL ONCE AS NEEDED
Status: CANCELLED | OUTPATIENT
Start: 2023-10-31

## 2023-10-31 RX ORDER — SODIUM CHLORIDE 9 MG/ML
40 INJECTION, SOLUTION INTRAVENOUS AS NEEDED
Status: CANCELLED | OUTPATIENT
Start: 2023-10-31

## 2023-10-31 RX ORDER — MISOPROSTOL 200 UG/1
800 TABLET ORAL AS NEEDED
Status: CANCELLED | OUTPATIENT
Start: 2023-10-31

## 2023-10-31 RX ORDER — NALOXONE HCL 0.4 MG/ML
0.4 VIAL (ML) INJECTION
Status: CANCELLED | OUTPATIENT
Start: 2023-10-31

## 2023-10-31 RX ORDER — TERBUTALINE SULFATE 1 MG/ML
0.25 INJECTION, SOLUTION SUBCUTANEOUS AS NEEDED
Status: CANCELLED | OUTPATIENT
Start: 2023-10-31

## 2023-10-31 RX ORDER — SODIUM CHLORIDE 0.9 % (FLUSH) 0.9 %
10 SYRINGE (ML) INJECTION EVERY 12 HOURS SCHEDULED
Status: CANCELLED | OUTPATIENT
Start: 2023-10-31

## 2023-10-31 RX ORDER — ACETAMINOPHEN 325 MG/1
650 TABLET ORAL EVERY 4 HOURS PRN
Status: CANCELLED | OUTPATIENT
Start: 2023-10-31

## 2023-10-31 RX ORDER — METHYLERGONOVINE MALEATE 0.2 MG/ML
200 INJECTION INTRAVENOUS ONCE AS NEEDED
Status: CANCELLED | OUTPATIENT
Start: 2023-10-31

## 2023-10-31 RX ORDER — SODIUM CHLORIDE 0.9 % (FLUSH) 0.9 %
10 SYRINGE (ML) INJECTION AS NEEDED
Status: CANCELLED | OUTPATIENT
Start: 2023-10-31

## 2023-10-31 RX ORDER — SODIUM CHLORIDE, SODIUM LACTATE, POTASSIUM CHLORIDE, CALCIUM CHLORIDE 600; 310; 30; 20 MG/100ML; MG/100ML; MG/100ML; MG/100ML
125 INJECTION, SOLUTION INTRAVENOUS CONTINUOUS
Status: CANCELLED | OUTPATIENT
Start: 2023-10-31

## 2023-10-31 RX ORDER — OXYTOCIN/0.9 % SODIUM CHLORIDE 30/500 ML
2-20 PLASTIC BAG, INJECTION (ML) INTRAVENOUS
Status: CANCELLED | OUTPATIENT
Start: 2023-10-31

## 2023-11-02 ENCOUNTER — HOSPITAL ENCOUNTER (INPATIENT)
Facility: HOSPITAL | Age: 28
LOS: 3 days | Discharge: HOME | End: 2023-11-05
Attending: OBSTETRICS & GYNECOLOGY | Admitting: OBSTETRICS & GYNECOLOGY
Payer: OTHER GOVERNMENT

## 2023-11-02 ENCOUNTER — HOSPITAL ENCOUNTER (OUTPATIENT)
Dept: LABOR AND DELIVERY | Facility: HOSPITAL | Age: 28
Discharge: HOME | End: 2023-11-02
Payer: OTHER GOVERNMENT

## 2023-11-02 DIAGNOSIS — Z34.90 TERM PREGNANCY: ICD-10-CM

## 2023-11-02 DIAGNOSIS — N13.2 URETERAL STONE WITH HYDRONEPHROSIS: ICD-10-CM

## 2023-11-02 LAB
ABO GROUP BLD: NORMAL
BLD GP AB SCN SERPL QL: NEGATIVE
DEPRECATED RDW RBC AUTO: 38.7 FL (ref 37–54)
ERYTHROCYTE [DISTWIDTH] IN BLOOD BY AUTOMATED COUNT: 12.8 % (ref 12.3–15.4)
HCT VFR BLD AUTO: 28.6 % (ref 34–46.6)
HGB BLD-MCNC: 9.6 G/DL (ref 12–15.9)
MCH RBC QN AUTO: 28.2 PG (ref 26.6–33)
MCHC RBC AUTO-ENTMCNC: 33.6 G/DL (ref 31.5–35.7)
MCV RBC AUTO: 83.9 FL (ref 79–97)
PLATELET # BLD AUTO: 213 10*3/MM3 (ref 140–450)
PMV BLD AUTO: 10.3 FL (ref 6–12)
RBC # BLD AUTO: 3.41 10*6/MM3 (ref 3.77–5.28)
RH BLD: POSITIVE
T&S EXPIRATION DATE: NORMAL
WBC NRBC COR # BLD: 7.29 10*3/MM3 (ref 3.4–10.8)

## 2023-11-02 PROCEDURE — 86900 BLOOD TYPING SEROLOGIC ABO: CPT | Performed by: OBSTETRICS & GYNECOLOGY

## 2023-11-02 PROCEDURE — 25810000003 LACTATED RINGERS PER 1000 ML: Performed by: OBSTETRICS & GYNECOLOGY

## 2023-11-02 PROCEDURE — 86901 BLOOD TYPING SEROLOGIC RH(D): CPT | Performed by: OBSTETRICS & GYNECOLOGY

## 2023-11-02 PROCEDURE — 59200 INSERT CERVICAL DILATOR: CPT | Performed by: OBSTETRICS & GYNECOLOGY

## 2023-11-02 PROCEDURE — 86850 RBC ANTIBODY SCREEN: CPT | Performed by: OBSTETRICS & GYNECOLOGY

## 2023-11-02 PROCEDURE — 85027 COMPLETE CBC AUTOMATED: CPT | Performed by: OBSTETRICS & GYNECOLOGY

## 2023-11-02 PROCEDURE — 3E033VJ INTRODUCTION OF OTHER HORMONE INTO PERIPHERAL VEIN, PERCUTANEOUS APPROACH: ICD-10-PCS | Performed by: OBSTETRICS & GYNECOLOGY

## 2023-11-02 RX ORDER — PROMETHAZINE HYDROCHLORIDE 12.5 MG/1
12.5 SUPPOSITORY RECTAL EVERY 6 HOURS PRN
Status: DISCONTINUED | OUTPATIENT
Start: 2023-11-02 | End: 2023-11-03 | Stop reason: HOSPADM

## 2023-11-02 RX ORDER — ACETAMINOPHEN 325 MG/1
650 TABLET ORAL EVERY 4 HOURS PRN
Status: DISCONTINUED | OUTPATIENT
Start: 2023-11-02 | End: 2023-11-03 | Stop reason: HOSPADM

## 2023-11-02 RX ORDER — NALOXONE HCL 0.4 MG/ML
0.4 VIAL (ML) INJECTION
Status: DISCONTINUED | OUTPATIENT
Start: 2023-11-02 | End: 2023-11-03

## 2023-11-02 RX ORDER — SODIUM CHLORIDE 0.9 % (FLUSH) 0.9 %
10 SYRINGE (ML) INJECTION EVERY 12 HOURS SCHEDULED
Status: DISCONTINUED | OUTPATIENT
Start: 2023-11-02 | End: 2023-11-03

## 2023-11-02 RX ORDER — MORPHINE SULFATE 2 MG/ML
1 INJECTION, SOLUTION INTRAMUSCULAR; INTRAVENOUS EVERY 4 HOURS PRN
Status: DISCONTINUED | OUTPATIENT
Start: 2023-11-02 | End: 2023-11-03

## 2023-11-02 RX ORDER — MAGNESIUM CARB/ALUMINUM HYDROX 105-160MG
30 TABLET,CHEWABLE ORAL ONCE
Status: DISCONTINUED | OUTPATIENT
Start: 2023-11-02 | End: 2023-11-03

## 2023-11-02 RX ORDER — OXYTOCIN/0.9 % SODIUM CHLORIDE 30/500 ML
2-20 PLASTIC BAG, INJECTION (ML) INTRAVENOUS
Status: DISCONTINUED | OUTPATIENT
Start: 2023-11-02 | End: 2023-11-03

## 2023-11-02 RX ORDER — SODIUM CHLORIDE, SODIUM LACTATE, POTASSIUM CHLORIDE, CALCIUM CHLORIDE 600; 310; 30; 20 MG/100ML; MG/100ML; MG/100ML; MG/100ML
125 INJECTION, SOLUTION INTRAVENOUS CONTINUOUS
Status: DISCONTINUED | OUTPATIENT
Start: 2023-11-02 | End: 2023-11-03

## 2023-11-02 RX ORDER — PROMETHAZINE HYDROCHLORIDE 12.5 MG/1
12.5 TABLET ORAL EVERY 6 HOURS PRN
Status: DISCONTINUED | OUTPATIENT
Start: 2023-11-02 | End: 2023-11-03 | Stop reason: HOSPADM

## 2023-11-02 RX ORDER — ONDANSETRON 2 MG/ML
4 INJECTION INTRAMUSCULAR; INTRAVENOUS EVERY 6 HOURS PRN
Status: DISCONTINUED | OUTPATIENT
Start: 2023-11-02 | End: 2023-11-03 | Stop reason: HOSPADM

## 2023-11-02 RX ORDER — SODIUM CHLORIDE 9 MG/ML
40 INJECTION, SOLUTION INTRAVENOUS AS NEEDED
Status: DISCONTINUED | OUTPATIENT
Start: 2023-11-02 | End: 2023-11-03

## 2023-11-02 RX ORDER — TERBUTALINE SULFATE 1 MG/ML
0.25 INJECTION, SOLUTION SUBCUTANEOUS AS NEEDED
Status: DISCONTINUED | OUTPATIENT
Start: 2023-11-02 | End: 2023-11-03

## 2023-11-02 RX ORDER — ONDANSETRON 4 MG/1
4 TABLET, FILM COATED ORAL EVERY 6 HOURS PRN
Status: DISCONTINUED | OUTPATIENT
Start: 2023-11-02 | End: 2023-11-03 | Stop reason: HOSPADM

## 2023-11-02 RX ORDER — HYDROXYZINE HYDROCHLORIDE 25 MG/1
25 TABLET, FILM COATED ORAL NIGHTLY
COMMUNITY
End: 2023-11-05

## 2023-11-02 RX ORDER — LIDOCAINE HYDROCHLORIDE 10 MG/ML
0.5 INJECTION, SOLUTION EPIDURAL; INFILTRATION; INTRACAUDAL; PERINEURAL ONCE AS NEEDED
Status: DISCONTINUED | OUTPATIENT
Start: 2023-11-02 | End: 2023-11-03

## 2023-11-02 RX ORDER — SODIUM CHLORIDE 0.9 % (FLUSH) 0.9 %
10 SYRINGE (ML) INJECTION AS NEEDED
Status: DISCONTINUED | OUTPATIENT
Start: 2023-11-02 | End: 2023-11-03

## 2023-11-02 RX ADMIN — Medication 2 MILLI-UNITS/MIN: at 22:36

## 2023-11-02 RX ADMIN — SODIUM CHLORIDE, POTASSIUM CHLORIDE, SODIUM LACTATE AND CALCIUM CHLORIDE 125 ML/HR: 600; 310; 30; 20 INJECTION, SOLUTION INTRAVENOUS at 21:05

## 2023-11-03 ENCOUNTER — ANESTHESIA (OUTPATIENT)
Dept: LABOR AND DELIVERY | Facility: HOSPITAL | Age: 28
End: 2023-11-03
Payer: OTHER GOVERNMENT

## 2023-11-03 ENCOUNTER — ANESTHESIA EVENT (OUTPATIENT)
Dept: LABOR AND DELIVERY | Facility: HOSPITAL | Age: 28
End: 2023-11-03
Payer: OTHER GOVERNMENT

## 2023-11-03 PROCEDURE — C1755 CATHETER, INTRASPINAL: HCPCS | Performed by: ANESTHESIOLOGY

## 2023-11-03 PROCEDURE — 25810000003 LACTATED RINGERS PER 1000 ML: Performed by: OBSTETRICS & GYNECOLOGY

## 2023-11-03 PROCEDURE — 0KQM0ZZ REPAIR PERINEUM MUSCLE, OPEN APPROACH: ICD-10-PCS | Performed by: OBSTETRICS & GYNECOLOGY

## 2023-11-03 PROCEDURE — 25010000002 BUPIVACAINE (PF) 0.25 % SOLUTION: Performed by: ANESTHESIOLOGY

## 2023-11-03 PROCEDURE — 25010000002 FENTANYL CITRATE (PF) 50 MCG/ML SOLUTION: Performed by: ANESTHESIOLOGY

## 2023-11-03 PROCEDURE — 25010000002 ROPIVACAINE PER 1 MG: Performed by: ANESTHESIOLOGY

## 2023-11-03 PROCEDURE — 25810000003 LACTATED RINGERS SOLUTION: Performed by: OBSTETRICS & GYNECOLOGY

## 2023-11-03 RX ORDER — MISOPROSTOL 200 UG/1
800 TABLET ORAL AS NEEDED
Status: DISCONTINUED | OUTPATIENT
Start: 2023-11-03 | End: 2023-11-05 | Stop reason: HOSPADM

## 2023-11-03 RX ORDER — ONDANSETRON 4 MG/1
4 TABLET, FILM COATED ORAL EVERY 8 HOURS PRN
Status: DISCONTINUED | OUTPATIENT
Start: 2023-11-03 | End: 2023-11-05 | Stop reason: HOSPADM

## 2023-11-03 RX ORDER — ACETAMINOPHEN 325 MG/1
650 TABLET ORAL EVERY 6 HOURS PRN
Status: DISCONTINUED | OUTPATIENT
Start: 2023-11-03 | End: 2023-11-05 | Stop reason: HOSPADM

## 2023-11-03 RX ORDER — CITRIC ACID/SODIUM CITRATE 334-500MG
30 SOLUTION, ORAL ORAL ONCE
Status: DISCONTINUED | OUTPATIENT
Start: 2023-11-03 | End: 2023-11-03

## 2023-11-03 RX ORDER — METHYLERGONOVINE MALEATE 0.2 MG/ML
200 INJECTION INTRAVENOUS ONCE AS NEEDED
Status: DISCONTINUED | OUTPATIENT
Start: 2023-11-03 | End: 2023-11-05 | Stop reason: HOSPADM

## 2023-11-03 RX ORDER — OXYTOCIN/0.9 % SODIUM CHLORIDE 30/500 ML
30 PLASTIC BAG, INJECTION (ML) INTRAVENOUS CONTINUOUS
Status: ACTIVE | OUTPATIENT
Start: 2023-11-03 | End: 2023-11-03

## 2023-11-03 RX ORDER — PRENATAL VIT/IRON FUM/FOLIC AC 27MG-0.8MG
1 TABLET ORAL DAILY
Status: DISCONTINUED | OUTPATIENT
Start: 2023-11-03 | End: 2023-11-05 | Stop reason: HOSPADM

## 2023-11-03 RX ORDER — LIDOCAINE HYDROCHLORIDE AND EPINEPHRINE 15; 5 MG/ML; UG/ML
INJECTION, SOLUTION EPIDURAL AS NEEDED
Status: DISCONTINUED | OUTPATIENT
Start: 2023-11-03 | End: 2023-11-03 | Stop reason: SURG

## 2023-11-03 RX ORDER — BUPIVACAINE HYDROCHLORIDE 2.5 MG/ML
INJECTION, SOLUTION EPIDURAL; INFILTRATION; INTRACAUDAL AS NEEDED
Status: DISCONTINUED | OUTPATIENT
Start: 2023-11-03 | End: 2023-11-03 | Stop reason: SURG

## 2023-11-03 RX ORDER — HYDROCODONE BITARTRATE AND ACETAMINOPHEN 5; 325 MG/1; MG/1
1 TABLET ORAL EVERY 4 HOURS PRN
Status: DISCONTINUED | OUTPATIENT
Start: 2023-11-03 | End: 2023-11-05 | Stop reason: HOSPADM

## 2023-11-03 RX ORDER — SIMETHICONE 80 MG
80 TABLET,CHEWABLE ORAL 4 TIMES DAILY PRN
Status: DISCONTINUED | OUTPATIENT
Start: 2023-11-03 | End: 2023-11-05 | Stop reason: HOSPADM

## 2023-11-03 RX ORDER — ONDANSETRON 2 MG/ML
4 INJECTION INTRAMUSCULAR; INTRAVENOUS ONCE AS NEEDED
Status: DISCONTINUED | OUTPATIENT
Start: 2023-11-03 | End: 2023-11-03

## 2023-11-03 RX ORDER — PROMETHAZINE HYDROCHLORIDE 12.5 MG/1
12.5 TABLET ORAL EVERY 4 HOURS PRN
Status: DISCONTINUED | OUTPATIENT
Start: 2023-11-03 | End: 2023-11-05 | Stop reason: HOSPADM

## 2023-11-03 RX ORDER — IBUPROFEN 600 MG/1
600 TABLET ORAL EVERY 6 HOURS PRN
Status: DISCONTINUED | OUTPATIENT
Start: 2023-11-03 | End: 2023-11-05 | Stop reason: HOSPADM

## 2023-11-03 RX ORDER — DOCUSATE SODIUM 100 MG/1
100 CAPSULE, LIQUID FILLED ORAL 2 TIMES DAILY
Status: DISCONTINUED | OUTPATIENT
Start: 2023-11-03 | End: 2023-11-05 | Stop reason: HOSPADM

## 2023-11-03 RX ORDER — FENTANYL CITRATE 50 UG/ML
INJECTION, SOLUTION INTRAMUSCULAR; INTRAVENOUS AS NEEDED
Status: DISCONTINUED | OUTPATIENT
Start: 2023-11-03 | End: 2023-11-03 | Stop reason: SURG

## 2023-11-03 RX ORDER — ROPIVACAINE HYDROCHLORIDE 2 MG/ML
15 INJECTION, SOLUTION EPIDURAL; INFILTRATION; PERINEURAL CONTINUOUS
Status: DISCONTINUED | OUTPATIENT
Start: 2023-11-03 | End: 2023-11-03

## 2023-11-03 RX ORDER — HYDROCODONE BITARTRATE AND ACETAMINOPHEN 5; 325 MG/1; MG/1
2 TABLET ORAL EVERY 6 HOURS PRN
Status: DISCONTINUED | OUTPATIENT
Start: 2023-11-03 | End: 2023-11-05 | Stop reason: HOSPADM

## 2023-11-03 RX ORDER — OXYTOCIN/0.9 % SODIUM CHLORIDE 30/500 ML
30 PLASTIC BAG, INJECTION (ML) INTRAVENOUS ONCE
Status: DISCONTINUED | OUTPATIENT
Start: 2023-11-03 | End: 2023-11-03 | Stop reason: HOSPADM

## 2023-11-03 RX ORDER — LIDOCAINE HCL/EPINEPHRINE/PF 2%-1:200K
VIAL (ML) INJECTION AS NEEDED
Status: DISCONTINUED | OUTPATIENT
Start: 2023-11-03 | End: 2023-11-03 | Stop reason: SURG

## 2023-11-03 RX ORDER — EPHEDRINE SULFATE 5 MG/ML
10 INJECTION INTRAVENOUS
Status: DISCONTINUED | OUTPATIENT
Start: 2023-11-03 | End: 2023-11-03

## 2023-11-03 RX ORDER — OXYTOCIN/0.9 % SODIUM CHLORIDE 30/500 ML
125 PLASTIC BAG, INJECTION (ML) INTRAVENOUS CONTINUOUS PRN
Status: DISCONTINUED | OUTPATIENT
Start: 2023-11-03 | End: 2023-11-05 | Stop reason: HOSPADM

## 2023-11-03 RX ORDER — DIPHENHYDRAMINE HYDROCHLORIDE 50 MG/ML
12.5 INJECTION INTRAMUSCULAR; INTRAVENOUS EVERY 8 HOURS PRN
Status: DISCONTINUED | OUTPATIENT
Start: 2023-11-03 | End: 2023-11-03

## 2023-11-03 RX ORDER — SODIUM CHLORIDE 0.9 % (FLUSH) 0.9 %
1-10 SYRINGE (ML) INJECTION AS NEEDED
Status: DISCONTINUED | OUTPATIENT
Start: 2023-11-03 | End: 2023-11-05 | Stop reason: HOSPADM

## 2023-11-03 RX ORDER — CARBOPROST TROMETHAMINE 250 UG/ML
250 INJECTION, SOLUTION INTRAMUSCULAR AS NEEDED
Status: DISCONTINUED | OUTPATIENT
Start: 2023-11-03 | End: 2023-11-05 | Stop reason: HOSPADM

## 2023-11-03 RX ORDER — FAMOTIDINE 10 MG/ML
20 INJECTION, SOLUTION INTRAVENOUS ONCE AS NEEDED
Status: DISCONTINUED | OUTPATIENT
Start: 2023-11-03 | End: 2023-11-03 | Stop reason: HOSPADM

## 2023-11-03 RX ORDER — NALBUPHINE HYDROCHLORIDE 10 MG/ML
5 INJECTION, SOLUTION INTRAMUSCULAR; INTRAVENOUS; SUBCUTANEOUS ONCE
Status: DISCONTINUED | OUTPATIENT
Start: 2023-11-03 | End: 2023-11-03

## 2023-11-03 RX ORDER — CALCIUM CARBONATE 500 MG/1
1 TABLET, CHEWABLE ORAL 3 TIMES DAILY PRN
Status: DISCONTINUED | OUTPATIENT
Start: 2023-11-03 | End: 2023-11-05 | Stop reason: HOSPADM

## 2023-11-03 RX ORDER — DIPHENHYDRAMINE HCL 25 MG
25 CAPSULE ORAL NIGHTLY PRN
Status: DISCONTINUED | OUTPATIENT
Start: 2023-11-03 | End: 2023-11-05 | Stop reason: HOSPADM

## 2023-11-03 RX ORDER — HYDROCORTISONE 25 MG/G
1 CREAM TOPICAL AS NEEDED
Status: DISCONTINUED | OUTPATIENT
Start: 2023-11-03 | End: 2023-11-05 | Stop reason: HOSPADM

## 2023-11-03 RX ORDER — ONDANSETRON 2 MG/ML
4 INJECTION INTRAMUSCULAR; INTRAVENOUS EVERY 6 HOURS PRN
Status: DISCONTINUED | OUTPATIENT
Start: 2023-11-03 | End: 2023-11-05 | Stop reason: HOSPADM

## 2023-11-03 RX ADMIN — IBUPROFEN 600 MG: 600 TABLET ORAL at 20:00

## 2023-11-03 RX ADMIN — SODIUM CHLORIDE, POTASSIUM CHLORIDE, SODIUM LACTATE AND CALCIUM CHLORIDE 1000 ML/HR: 600; 310; 30; 20 INJECTION, SOLUTION INTRAVENOUS at 03:20

## 2023-11-03 RX ADMIN — PRENATAL VITAMINS-IRON FUMARATE 27 MG IRON-FOLIC ACID 0.8 MG TABLET 1 TABLET: at 12:30

## 2023-11-03 RX ADMIN — Medication 1 APPLICATION: at 12:30

## 2023-11-03 RX ADMIN — IBUPROFEN 600 MG: 600 TABLET ORAL at 12:35

## 2023-11-03 RX ADMIN — WITCH HAZEL: 500 SOLUTION RECTAL; TOPICAL at 12:30

## 2023-11-03 RX ADMIN — LIDOCAINE HYDROCHLORIDE AND EPINEPHRINE 2 ML: 15; 5 INJECTION, SOLUTION EPIDURAL at 03:44

## 2023-11-03 RX ADMIN — Medication: at 12:30

## 2023-11-03 RX ADMIN — DOCUSATE SODIUM 100 MG: 100 CAPSULE, LIQUID FILLED ORAL at 12:30

## 2023-11-03 RX ADMIN — FENTANYL CITRATE 100 MCG: 50 INJECTION, SOLUTION INTRAMUSCULAR; INTRAVENOUS at 03:45

## 2023-11-03 RX ADMIN — SODIUM CHLORIDE, POTASSIUM CHLORIDE, SODIUM LACTATE AND CALCIUM CHLORIDE 1000 ML: 600; 310; 30; 20 INJECTION, SOLUTION INTRAVENOUS at 03:03

## 2023-11-03 RX ADMIN — BUPIVACAINE HYDROCHLORIDE 12 ML: 2.5 INJECTION, SOLUTION EPIDURAL; INFILTRATION; INTRACAUDAL; PERINEURAL at 03:46

## 2023-11-03 RX ADMIN — LIDOCAINE HYDROCHLORIDE AND EPINEPHRINE 7 ML: 20; 5 INJECTION, SOLUTION EPIDURAL; INFILTRATION; INTRACAUDAL; PERINEURAL at 07:50

## 2023-11-03 RX ADMIN — ROPIVACAINE HYDROCHLORIDE 15 ML/HR: 2 INJECTION, SOLUTION EPIDURAL; INFILTRATION; PERINEURAL at 03:50

## 2023-11-03 RX ADMIN — LIDOCAINE HYDROCHLORIDE AND EPINEPHRINE 3 ML: 15; 5 INJECTION, SOLUTION EPIDURAL at 03:43

## 2023-11-03 NOTE — ANESTHESIA PROCEDURE NOTES
Labor Epidural      Patient reassessed immediately prior to procedure    Patient location during procedure: OB  Performed By  Anesthesiologist: Delfino David DO  Preanesthetic Checklist  Completed: patient identified, IV checked, site marked, risks and benefits discussed, surgical consent, monitors and equipment checked, pre-op evaluation and timeout performed  Prep:  Pt Position:sitting  Sterile Tech:gloves, mask, sterile barrier and cap  Prep:chlorhexidine gluconate and isopropyl alcohol  Monitoring:blood pressure monitoring and continuous pulse oximetry  Epidural Block Procedure:  Approach:midline  Guidance:landmark technique and palpation technique  Location:L3-L4  Needle Type:Tuohy  Needle Gauge:17 G  Loss of Resistance Medium: air  Loss of Resistance: 5cm  Cath Depth at skin:11 cm  Paresthesia: none  Aspiration:negative  Test Dose:negative  Number of Attempts: 1  Post Assessment:  Dressing:secured with tape and occlusive dressing applied (Tegaderm Placed)  Pt Tolerance:patient tolerated the procedure well with no apparent complications  Complications:no

## 2023-11-03 NOTE — PAYOR COMM NOTE
"Veronika Hall (28 y.o. Female)      Auth#[id number] (Is this the correct auth# for delivery?)    Delivery information.    From:Stephanie Ghosh LPN, Utilization Review  Phone #[phone]  Fax #[phone]          Date of Birth   1995    Social Security Number       Address   [address] [place], [place] [address]    Home Phone   [phone]    MRN   [id number]       Mu-ism   None    Marital Status                               Admission Date   11/2/23    Admission Type   Elective    Admitting Provider   Barbara Sigala MD    Attending Provider   Barbara Sigala MD    Department, Room/Bed   [facility] MOTHER [facility]ABY 4A, N403/1       Discharge Date       Discharge Disposition       Discharge Destination                                 Attending Provider: Barbara Sigala MD    Allergies: Sulfa Antibiotics    Isolation: None   Infection: None   Code Status: CPR    Ht: 175.3 cm (69\")   Wt: 80.7 kg (178 lb)    Admission Cmt: None   Principal Problem: Term pregnancy [Z34.90]                   Active Insurance as of 11/2/2023       Primary Coverage       Payor Plan Insurance Group Employer/Plan Group     [facility]        Payor Plan Address Payor Plan Phone Number Payor Plan Fax Number Effective Dates    [address] [phone]  3/16/2021 - None Entered    [facility] [id number]         Subscriber Name Subscriber Birth Date Member ID       VERONIKA HALL 1995 [id number]                     Emergency Contacts        (Rel.) Home Phone Work Phone Mobile Phone    DEONTE NICHOLS (Spouse) [phone] -- [phone]              Insurance Information                  / [facility] Phone: [phone]    Subscriber: Veronika Hall Subscriber#: [id number]    Group#: -- Precert#: --             History & Physical        Barbara Sigala MD at 11/03/23 0749          [facility]  Obstetric History and " Physical    CC: IOL    SUBJECTIVE:     Patient is a 28 y.o. female  currently at 41w2d, who presents with uncomplicated pregnancy for IOL. Had FB overnight. ROM occurred with placement. Is uncomfortable      Prenatal Information:  Prenatal Results       Initial Prenatal Labs       Test Value Reference Range Date Time    Hemoglobin  11.6 g/dL 12.0 - 15.9 23 1033    Hematocrit  34.4 % 34.0 - 46.6 23 1033    Platelets  213 10*3/mm3 140 - 450 23 1033    Rubella IgG  2.10 index Immune >0.99 23 1033    Hepatitis B SAg  Non-Reactive  Non-Reactive 23 1033    Hepatitis C Ab  Non-Reactive  Non-Reactive 23 1033    RPR  Non-Reactive  Non-Reactive 23 1033    T. Pallidum Ab         ABO  A   23    Rh  Positive   23    Antibody Screen        HIV  Non-Reactive  Non-Reactive 23 1033    Urine Culture  No growth   10/05/23 1106       No growth   23 1033    Gonorrhea  Negative  Negative 23 1033    Chlamydia  Negative  Negative 23 1033    TSH  0.666 uIU/mL 0.270 - 4.200 21 1516    HgB A1c         Varicella IgG        HgB Electrophoresis         Cystic fibrosis                   Fetal testing        Test Value Reference Range Date Time    NIPT        MSAFP        AFP-4                  2nd and 3rd Trimester       Test Value Reference Range Date Time    Hemoglobin (repeated)  9.6 g/dL 12.0 - 15.9 23       10.0 g/dL 12.0 - 15.9 23 1830    Hematocrit (repeated)  28.6 % 34.0 - 46.6 238       29.4 % 34.0 - 46.6 23 1830    Platelets   213 10*3/mm3 140 - 450 23 2118       189 10*3/mm3 140 - 450 23 1830       213 10*3/mm3 140 - 450 23 1033    GCT  82 mg/dL 65 - 139 23 1632    Antibody Screen (repeated)  Negative   23    GTT Fasting        GTT 1 Hr        GTT 2 Hr        GTT 3 Hr        Group B Strep ^ Negative   10/05/23               Other testing        Test Value Reference  Range Date Time    Parvo IgG         CMV IgG                   Drug Screening       Test Value Reference Range Date Time    Amphetamine Screen  Negative  Negative 03/27/23 1033    Barbiturate Screen  Negative  Negative 03/27/23 1033    Benzodiazepine Screen  Negative  Negative 03/27/23 1033    Methadone Screen  Negative  Negative 03/27/23 1033    Phencyclidine Screen  Negative  Negative 03/27/23 1033    Opiates Screen  Negative  Negative 03/27/23 1033    THC Screen  Negative  Negative 03/27/23 1033    Cocaine Screen  Negative  Negative 03/27/23 1033    Propoxyphene Screen  Negative  Negative 03/27/23 1033    Buprenorphine Screen  Negative  Negative 03/27/23 1033    Methamphetamine Screen  Negative  Negative 03/27/23 1033    Oxycodone Screen  Negative  Negative 03/27/23 1033    Tricyclic Antidepressants Screen  Negative  Negative 03/27/23 1033              Legend    ^: Historical                          External Prenatal Results       Pregnancy Outside Results - Transcribed From Office Records - See Scanned Records For Details       Test Value Date Time    ABO  A  11/02/23 2118    Rh  Positive  11/02/23 2118    Antibody Screen  Negative  11/02/23 2118    Varicella IgG       Rubella  2.10 index 03/27/23 1033    Hgb  9.6 g/dL 11/02/23 2118       10.0 g/dL 07/11/23 1830       11.6 g/dL 03/27/23 1033    Hct  28.6 % 11/02/23 2118       29.4 % 07/11/23 1830       34.4 % 03/27/23 1033    Glucose Fasting GTT       Glucose Tolerance Test 1 hour       Glucose Tolerance Test 3 hour       Gonorrhea (discrete)  Negative  03/27/23 1033    Chlamydia (discrete)  Negative  03/27/23 1033    RPR  Non-Reactive  03/27/23 1033    VDRL       Syphilis Antibody       HBsAg  Non-Reactive  03/27/23 1033    Herpes Simplex Virus PCR       Herpes Simplex VIrus Culture       HIV  Non-Reactive  03/27/23 1033    Hep C RNA Quant PCR       Hep C Antibody  Non-Reactive  03/27/23 1033    AFP       Group B Strep ^ Negative  10/05/23     GBS  Susceptibility to Clindamycin       GBS Susceptibility to Erythromycin       Fetal Fibronectin       Genetic Testing, Maternal Blood                 Drug Screening       Test Value Date Time    Urine Drug Screen       Amphetamine Screen  Negative  23 1033    Barbiturate Screen  Negative  23 1033    Benzodiazepine Screen  Negative  23 1033    Methadone Screen  Negative  23 1033    Phencyclidine Screen  Negative  23 1033    Opiates Screen  Negative  23 1033    THC Screen  Negative  23 1033    Cocaine Screen       Propoxyphene Screen  Negative  23 1033    Buprenorphine Screen  Negative  23 1033    Methamphetamine Screen       Oxycodone Screen  Negative  23 1033    Tricyclic Antidepressants Screen  Negative  23 1033              Legend    ^: Historical                             OB History:                     OB History    Para Term  AB Living   2 1 1 0 0 1   SAB IAB Ectopic Molar Multiple Live Births   0 0 0 0 0 1      # Outcome Date GA Lbr Jose De Jesus/2nd Weight Sex Delivery Anes PTL Lv   2 Current            1 Term 21 40w2d / 01:50 3430 g (7 lb 9 oz) M Vag-Spont EPI N LAST      Name: MALLY LOPEZRAFI      Apgar1: 8  Apgar5: 9      Allergies:                        Allergies   Allergen Reactions    Sulfa Antibiotics Hives      Past Medical History: Past Medical History:   Diagnosis Date    Anemia 2020    Anxiety     Disease of thyroid gland 2018    Pre Hashimoto's, nodules    Fibroid 2019    Fibroadanoma-left breast    Urinary tract infection       Past Surgical History Past Surgical History:   Procedure Laterality Date    BREAST BIOPSY      Benign fibroadanoma    CLAVICLE SURGERY      AS A CHILD    EYE SURGERY Left     AS A CHILD    URETEROSCOPY LASER LITHOTRIPSY WITH STENT INSERTION Right 2022    Procedure: CYSTOSCOPY, Right retrograde pyelograms  URETEROSCOPY LASER LITHOTRIPSY WITH STENT placement;  Surgeon: John Crowder  MD Sen;  Location: [facility];  Service: Urology;  Laterality: Right;    WISDOM TOOTH EXTRACTION  2017      Family History: Family History   Problem Relation Age of Onset    Hashimoto's thyroiditis Mother       Social History:  reports that she has never smoked. She has never used smokeless tobacco.   reports no history of alcohol use.   reports no history of drug use.    General ROS: Pertinent items are noted in HPI, all other systems reviewed and negative   Medications: docusate sodium, fluticasone, hydrOXYzine, oxyCODONE, phenazopyridine, prenatal vitamin 27-0.8, sertraline, and tamsulosin       Objective      Vital Signs Range for the last 24 hours  Temperature: Temp:  [97.5 °F (36.4 °C)-97.9 °F (36.6 °C)] 97.9 °F (36.6 °C)   BP: BP: (107-130)/(57-86) 112/60   Pulse: Heart Rate:  [66-95] 74   Respirations: Resp:  [18] 18   SPO2: SpO2:  [98 %] 98 %               Physical Examination: General appearance - alert, well appearing, and in no distress  Chest - clear to auscultation, no wheezes, rales or rhonchi, symmetric air entry  Heart - normal rate, regular rhythm, normal S1, S2, no murmurs, rubs, clicks or gallops  Abdomen - Soft, gravid, nontender  Extremities - no pedal edema noted      Presentation: VTX   Cervix: MD   Dilation: 9   Effacement: 100   Station: 0       Fetal Heart Rate Assessment           Baseline: Fetal HR Baseline: normal range   Variability: Fetal HR Variability: moderate (amplitude range 6 to 25 bpm)   Accels: Fetal HR Accelerations: greater than/equal to 15 bpm, lasting at least 15 seconds   Decels: Fetal HR Decelerations: absent   Tracing Category:       Uterine Assessment   Method: Method: external tocotransducer   Frequency (min): Contraction Frequency (Minutes): 3-4   Ctx Count in 10 min:       Laboratory Results:  WBC   Date Value Ref Range Status   11/02/2023 7.29 3.40 - 10.80 10*3/mm3 Final     RBC   Date Value Ref Range Status   11/02/2023 3.41 (L) 3.77 - 5.28 10*6/mm3 Final      Hemoglobin   Date Value Ref Range Status   2023 9.6 (L) 12.0 - 15.9 g/dL Final     Hematocrit   Date Value Ref Range Status   2023 28.6 (L) 34.0 - 46.6 % Final     MCV   Date Value Ref Range Status   2023 83.9 79.0 - 97.0 fL Final     MCH   Date Value Ref Range Status   2023 28.2 26.6 - 33.0 pg Final     MCHC   Date Value Ref Range Status   2023 33.6 31.5 - 35.7 g/dL Final     RDW   Date Value Ref Range Status   2023 12.8 12.3 - 15.4 % Final     RDW-SD   Date Value Ref Range Status   2023 38.7 37.0 - 54.0 fl Final     MPV   Date Value Ref Range Status   2023 10.3 6.0 - 12.0 fL Final     Platelets   Date Value Ref Range Status   2023 213 140 - 450 10*3/mm3 Final             Assessment/Plan:   IUP 41w2d here for IOL    1.cont IOL: has progressed well  2.GBS neg  3.FWB reassuring      Barbara Sigala MD  11/3/2023  07:49 EDT      Electronically signed by Barbara Sigala MD at 23 0751       Facility-Administered Medications as of 11/3/2023   Medication Dose Route Frequency Provider Last Rate Last Admin    [COMPLETED] lactated ringers bolus 1,000 mL  1,000 mL Intravenous Once Barbara Sigala MD 4,000 mL/hr at 23 0303 1,000 mL at 23 0303    [] oxytocin (PITOCIN) 30 units in 0.9% sodium chloride 500 mL (premix)  30 Units Intravenous Continuous Barbara Sigala MD   Rate Change at 23 0901     Orders (last 24 hrs)        Start     Ordered    23 1000  oxytocin (PITOCIN) 30 units in 0.9% sodium chloride 500 mL (premix)  Continuous        See Hyperspace for full Linked Orders Report.    23 0847    23 0945  oxytocin (PITOCIN) 30 units in 0.9% sodium chloride 500 mL (premix)  Once,   Status:  Discontinued        See Hyperspace for full Linked Orders Report.    23 0847    23 0859  VTE Prophylaxis Not Indicated: No Risk Factors (0); </= 3 (Low Risk)  Once         23 0859    23 0851  DIET MESSAGE  Pancakes, quijano, eggs please  Once        Comments: Pancakes, quijano, eggs please    11/03/23 0851    11/03/23 0848  Notify Physician (specified)  Until Discontinued,   Status:  Canceled         11/03/23 0847    11/03/23 0848  Vital Signs Per hospital policy  Per Hospital Policy         11/03/23 0847    11/03/23 0848  Fundal & Lochia Check  Per Order Details        Comments: Every 15 Minutes x4, Then Every 30 Minutes x2, Then Every Shift    11/03/23 0847    11/03/23 0848  Diet: Regular/House Diet; Texture: Regular Texture (IDDSI 7); Fluid Consistency: Thin (IDDSI 0)  Diet Effective Now         11/03/23 0847    11/03/23 0848  Blood Gas, Arterial, Cord  Once,   Status:  Canceled        Comments: If requested by provider at the time of delivery      11/03/23 0847 11/03/23 0847  Fundal & Lochia Check  Every Shift       11/03/23 0847    11/03/23 0415  ropivacaine (NAROPIN) 0.2 % injection  Continuous,   Status:  Discontinued         11/03/23 0328 11/03/23 0415  Sod Citrate-Citric Acid (BICITRA) oral solution 30 mL  Once,   Status:  Discontinued         11/03/23 0328 11/03/23 0337  ! Epidural  Continuous PRN,   Status:  Discontinued         11/03/23 0337 11/03/23 0330  nalbuphine (NUBAIN) injection 5 mg  Once,   Status:  Discontinued         11/03/23 0233    11/03/23 0328  Vital Signs Per Anesthesia Guidelines  Per Order Details,   Status:  Canceled        Comments: Every 3 Minutes x20 Minutes Following Epidural Dosing, Then Every 15 Minutes If Stable    11/03/23 0328 11/03/23 0328  Start IV with #16 or #18 gauge angiocath.  Once,   Status:  Canceled         11/03/23 0328 11/03/23 0328  Nurse or anesthesiologist to remain with patient for 15 minutes following dosing.  Until Discontinued,   Status:  Canceled         11/03/23 0328 11/03/23 0328  Facilitate maternal postion on side and maintain uterine displacement.  Until Discontinued,   Status:  Canceled         11/03/23 0328 11/03/23 0328  Consult  anesthesia services prior to changing epidural infusion/rate.  Until Discontinued,   Status:  Canceled         11/03/23 0328 11/03/23 0327  ondansetron (ZOFRAN) injection 4 mg  Once As Needed,   Status:  Discontinued         11/03/23 0328 11/03/23 0327  famotidine (PEPCID) injection 20 mg  Once As Needed,   Status:  Discontinued         11/03/23 0328 11/03/23 0327  diphenhydrAMINE (BENADRYL) injection 12.5 mg  Every 8 Hours PRN,   Status:  Discontinued         11/03/23 0328 11/03/23 0327  lactated ringers bolus 1,000 mL  As Needed,   Status:  Discontinued         11/03/23 0328 11/03/23 0327  ePHEDrine Sulfate (Pressors) 5 MG/ML injection 10 mg  Every 10 Minutes PRN,   Status:  Discontinued         11/03/23 0328 11/03/23 0000  Vital Signs q 4 while awake  Every 4 Hours,   Status:  Canceled      Comments: While the patient is awake.    11/02/23 2005 11/03/23 0000  Group B Streptococcus Culture - Swab, Vaginal/Rectum        Comments: This is an external result entered through the Results Console.      11/03/23 0716    11/02/23 2100  sodium chloride 0.9 % flush 10 mL  Every 12 Hours Scheduled,   Status:  Discontinued         11/02/23 2005 11/02/23 2100  lactated ringers bolus 1,000 mL  Once         11/02/23 2005 11/02/23 2100  lactated ringers infusion  Continuous,   Status:  Discontinued         11/02/23 2005 11/02/23 2100  oxytocin (PITOCIN) 30 units in 0.9% sodium chloride 500 mL (premix)  Titrated,   Status:  Discontinued         11/02/23 2005 11/02/23 2100  mineral oil liquid 30 mL  Once,   Status:  Discontinued         11/02/23 2005 11/02/23 2006  Admit To Obstetrics Inpatient  Once         11/02/23 2005 11/02/23 2006  Code Status and Medical Interventions:  Continuous         11/02/23 2005 11/02/23 2006  Obtain informed consent  Once,   Status:  Canceled         11/02/23 2005 11/02/23 2006  Vital Signs Per hospital policy  Per Hospital Policy,   Status:  Canceled          23  Continuous Fetal Monitoring With NST on Admission and Prior to Initiation of Oxytocin.  Per Order Details,   Status:  Canceled        Comments: Continuous Fetal Monitoring With NST on Admission & Prior to Initiation of Oxytocin.    23  External Uterine Contraction Monitoring  Per Hospital Policy,   Status:  Canceled         23  Notify Physician (specified)  Until Discontinued,   Status:  Canceled         23  Notify physician for tachysystole (per hospital algorithm)  Until Discontinued,   Status:  Canceled         23  Notify physician if membranes ruptured, bleeding greater than 1 pad an hour, fetal heart tone abnormality, and severe pain  Until Discontinued,   Status:  Canceled         23  Up Ad Lanette  Until Discontinued,   Status:  Canceled         23  Initiate Group Beta Strep (GBS) Prophylaxis Protocol, If Criteria Met  Continuous,   Status:  Canceled        Comments: NO TREATMENT RECOMMENDED IF: 1)  Maternal GBS status known negative 2)  Scheduled  birth with intact membranes, not in labor.  3 ) Maternal GBS unknown, no risk factors.   TREAT WITH ANTIBIOTICS IF:  1)  Maternal GBS status is known postive.  2)  Maternal GBS status unknown with these risk factors:  a)  Previous infant affected by GBS infection.  b)  GBS urinary tract infection (UTI) or bacteruria during pregnancy  c)  Unexplained maternal fever in labor (greater than or equal to 100.4F or 38.0C)  d)  Prolonged rupture of the membranes greater than or equal to 18 hours.  e)  Gestational age less than 37 weeks.    23  Assess Need for Indwelling Urinary Catheter - Follow Removal Protocol  Continuous,   Status:  Canceled        Comments: Indwelling Urinary Catheter Removal Criteria  Discontinue Indwelling Urinary  Catheter Unless One of the Following is Present:  Urinary Retention or Obstruction  Chronic Urinary Catheter Use  End of Life  Critical Illness with Strict I/O   Tract or Abdominal Surgery  Stage 3/4 Sacral / Perineal Wound  Required Activity Restriction: Trauma  Required Activity Restriction: Spine Surgery  If Patient is Being Followed by Urology Contact Them PRIOR to Removal  Do Not Remove Indwelling Urinary Catheter Order is Present with a CLINICAL REASON to Maintain the Catheter. Provider is Required to Include a Clinical Reason to Maintain a Urinary Catheter    Patient Admitted With Indwelling Urinary Catheter (Not Placed at [facility])  Assess for Continued Need & Document Medical Necessity  If Infection is Suspected, Contact the Provider       See [place] for full Linked Orders Report.    11/02/23 2005 11/02/23 2006  Burnett Bulb Cervical Ripening w/o infusion  Once,   Status:  Canceled        Comments: Have Laborist Place Cervical Burnett Without Infusion.    Once FB is placed: Start Low Dose Pitocin Drip Overnight, Then Increase Per Protocol at 0500 Next Morning    11/02/23 2005 11/02/23 2006  NPO Diet NPO Type: Ice Chips  Diet Effective Now,   Status:  Canceled         11/02/23 2005 11/02/23 2006  CBC (No Diff)  Once         11/02/23 2005 11/02/23 2006  Type & Screen  Once         11/02/23 2005 11/02/23 2006  Insert Peripheral IV  Once,   Status:  Canceled         11/02/23 2005 11/02/23 2006  Saline Lock & Maintain IV Access  Continuous,   Status:  Canceled         11/02/23 2005 11/02/23 2005  Position change  As Needed,   Status:  Canceled      Comments: For intra-uterine resuscitation for hypertonus, hypertstimulation, or non-reassuring fetal status    11/02/23 2005 11/02/23 2005  Insert Indwelling Urinary Catheter  As Needed,   Status:  Canceled        Comments: EPIDURAL PLACEMENT   See [place] for full Linked Orders Report.    11/02/23 2005 11/02/23 2005  Urinary  Catheter Care  Every Shift,   Status:  Canceled      See Hyperspace for full Linked Orders Report.    11/02/23 2005 11/02/23 2005  sodium chloride 0.9 % flush 10 mL  As Needed,   Status:  Discontinued         11/02/23 2005 11/02/23 2005  sodium chloride 0.9 % infusion 40 mL  As Needed,   Status:  Discontinued         11/02/23 2005 11/02/23 2005  lidocaine PF 1% (XYLOCAINE) injection 0.5 mL  Once As Needed,   Status:  Discontinued         11/02/23 2005 11/02/23 2005  acetaminophen (TYLENOL) tablet 650 mg  Every 4 Hours PRN,   Status:  Discontinued         11/02/23 2005 11/02/23 2005  ondansetron (ZOFRAN) tablet 4 mg  Every 6 Hours PRN,   Status:  Discontinued        See Hyperspace for full Linked Orders Report.    11/02/23 2005 11/02/23 2005  ondansetron (ZOFRAN) injection 4 mg  Every 6 Hours PRN,   Status:  Discontinued        See Hyperspace for full Linked Orders Report.    11/02/23 2005 11/02/23 2005  promethazine (PHENERGAN) suppository 12.5 mg  Every 6 Hours PRN,   Status:  Discontinued        See Hyperspace for full Linked Orders Report.    11/02/23 2005 11/02/23 2005  promethazine (PHENERGAN) tablet 12.5 mg  Every 6 Hours PRN,   Status:  Discontinued        See Hyperspace for full Linked Orders Report.    11/02/23 2005 11/02/23 2005  terbutaline (BRETHINE) injection 0.25 mg  As Needed,   Status:  Discontinued         11/02/23 2005 11/02/23 2005  morphine injection 1 mg  Every 4 Hours PRN,   Status:  Discontinued        See Hyperspace for full Linked Orders Report.    11/02/23 2005 11/02/23 2005  naloxone (NARCAN) injection 0.4 mg  Every 5 Minutes PRN,   Status:  Discontinued        See Hyperspace for full Linked Orders Report.    11/02/23 2005 11/02/23 2005  morphine injection 1 mg  Every 4 Hours PRN,   Status:  Discontinued        See Hyperspace for full Linked Orders Report.    11/02/23 2005 11/02/23 2005  naloxone (NARCAN) injection 0.4 mg  Every 5 Minutes PRN,    Status:  Discontinued        See [place] for full Linked Orders Report.    11/02/23 2005    Unscheduled  Up with Assistance  As Needed       11/03/23 0847    Unscheduled  Apply Ice to Perineum  As Needed       11/03/23 0847    Unscheduled  Bladder Assessment  As Needed       11/03/23 0847    --  hydrOXYzine (ATARAX) 25 MG tablet  Nightly         11/02/23 2040    Signed and Held  Nurse may remove epidural catheter after delivery.  Until Discontinued         Signed and Held    Signed and Held  Transfer to postpartum when discharge criteria met.  Until Discontinued         Signed and Held    Signed and Held  Code Status and Medical Interventions:  Continuous         Signed and Held    Signed and Held  Vital Signs Per Hospital Policy  Per Hospital Policy         Signed and Held    Signed and Held  Notify Physician  Until Discontinued         Signed and Held    Signed and Held  Up Ad Lanette  Until Discontinued         Signed and Held    Signed and Held  Ambulate Patient  Every Shift       Signed and Held    Signed and Held  Diet: Regular/House Diet; Texture: Regular Texture (IDDSI 7); Fluid Consistency: Thin (IDDSI 0)  Diet Effective Now         Signed and Held    Signed and Held  Advance Diet As Tolerated -Regular  Until Discontinued         Signed and Held    Signed and Held  Fundal and Lochia Check  Per Order Details        Comments: Every 30 minutes x2, then Every Shift    Signed and Held    Signed and Held  RN to Assess Rh Status & Place RhIG Evaluation Order if Indicated  Continuous         Signed and Held    Signed and Held  Bladder Assessment  Per Order Details        Comments: Postpartum 1) Upon Admission to Unit & Every 4 Hours PRN Until Voiding. 2) Out of Bed to Void in 8 Hours.    Signed and Held    Signed and Held  Straight Cath  Per Order Details        Comments: Postpartum: If Distended & Unable to Void, May Repeat Once.    Signed and Held    Signed and Held  Indwelling Urinary Catheter  Per Order Details         Comments: Postpartum : After Straight Cathed x2 or if Greater Than 1000mL Residual, Insert Indwelling Urinary Catheter Until Further MD Order.    Signed and Held    Signed and Held  Apply Ice to Perineum  Per Order Details        Comments: For 20 Minutes Every 2 Hours    Signed and Held    Signed and Held  Sitz Bath  3 Times Daily        Comments: PRN    Signed and Held    Signed and Held  Kpad  Per Order Details        Comments: For Pain    Signed and Held    Signed and Held  Warm compress  As Needed         Signed and Held    Signed and Held  Breast pump to bed  Once         Signed and Held    Signed and Held  Apply ace wrap, tight bra, or binder  As Needed         Signed and Held    Signed and Held  Apply ice packs  As Needed         Signed and Held    Signed and Held  If indicated -- Please administer RH Immunoglobulin based on results of cord blood evaluation and fetal screen lab tests, pharmacy to dispense  Per Order Details        Comments: See Process Instructions For Reference Range Details.    Signed and Held    Signed and Held  CBC & Differential  Morning Draw        Comments: Postpartum Day 1      Signed and Held    Signed and Held  sodium chloride 0.9 % flush 1-10 mL  As Needed         Signed and Held    Signed and Held  oxytocin (PITOCIN) 30 units in 0.9% sodium chloride 500 mL (premix)  Continuous PRN         Signed and Held    Signed and Held  ibuprofen (ADVIL,MOTRIN) tablet 600 mg  Every 6 Hours PRN         Signed and Held    Signed and Held  acetaminophen (TYLENOL) tablet 650 mg  Every 6 Hours PRN         Signed and Held    Signed and Held  HYDROcodone-acetaminophen (NORCO) 5-325 MG per tablet 1 tablet  Every 4 Hours PRN        See Hyperspace for full Linked Orders Report.    Signed and Held    Signed and Held  carboprost (HEMABATE) injection 250 mcg  As Needed         Signed and Held    Signed and Held  miSOPROStol (CYTOTEC) tablet 800 mcg  As Needed         Signed and Held    Signed and  Held  methylergonovine (METHERGINE) injection 200 mcg  Once As Needed         Signed and Held    Signed and Held  diphenhydrAMINE (BENADRYL) capsule 25 mg  Nightly PRN         Signed and Held    Signed and Held  magnesium hydroxide (MILK OF MAGNESIA) suspension 10 mL  Daily PRN         Signed and Held    Signed and Held  docusate sodium (COLACE) capsule 100 mg  2 Times Daily         Signed and Held    Signed and Held  lanolin topical 1 application   Every 1 Hour PRN         Signed and Held    Signed and Held  benzocaine-menthol (DERMOPLAST) 20-0.5 % topical spray  As Needed         Signed and Held    Signed and Held  witch hazel-glycerin (TUCKS) pad  As Needed         Signed and Held    Signed and Held  Hydrocortisone (Perianal) (ANUSOL-HC) 2.5 % rectal cream 1 application   As Needed         Signed and Held    Signed and Held  benzocaine (AMERICAINE) 20 % rectal ointment 1 application   As Needed         Signed and Held    Signed and Held  ondansetron (ZOFRAN) tablet 4 mg  Every 8 Hours PRN         Signed and Held    Signed and Held  ondansetron (ZOFRAN) injection 4 mg  Every 6 Hours PRN         Signed and Held    Signed and Held  promethazine (PHENERGAN) tablet 12.5 mg  Every 4 Hours PRN         Signed and Held    Signed and Held  prenatal vitamin tablet 1 tablet  Daily         Signed and Held    Signed and Held  simethicone (MYLICON) chewable tablet 80 mg  4 Times Daily PRN         Signed and Held    Signed and Held  calcium carbonate (TUMS) chewable tablet 500 mg (200 mg elemental)  3 Times Daily PRN         Signed and Held    Signed and Held  Measles, Mumps & Rubella Vac (MMR) injection 0.5 mL  During Hospitalization         Signed and Held    Signed and Held  HYDROcodone-acetaminophen (NORCO) 5-325 MG per tablet 2 tablet  Every 6 Hours PRN        See Hyperspace for full Linked Orders Report.    Signed and Held                     Operative/Procedure Notes (last 24 hours)        Barbara Sigala MD at 11/03/23  "[phone]           [facility]   Vaginal Delivery Note    Patient Name: Veronika Hall  : 1995  MRN: [id number]    Date of Delivery: 11/3/2023     Diagnosis     Pre & Post-Delivery:  Intrauterine pregnancy at 41w2d  Labor status: Induced Onset of Labor     Term pregnancy             Problem List    Transfer to Postpartum     Review the Delivery Report for details.     Delivery     Delivery:   Spontaneous, Vaginal   YOB: 2024    Time of Birth:  Gestational Age 8:42 AM   41w2d     Anesthesia:   Epidural   Delivering clinician:  Barbaar Sigala MD   Forceps?   No   Vacuum? No    Shoulder dystocia present: No        Delivery narrative:  The patient progressed to complete and delivered a VMI  with Apagrs  the weight is  8#7 via  with epidural anesthesia. Shoulders were delivered easily. The cord was clamped and cut after 60 second delay and the infant was placed on the mother's chest for skin- to - skin. Cord blood was obtained and the placenta was delivered spontaneously intact. 30 units of IV Pitocin was started. Uterine tone was appropriate.   Second degree laceration noted  and repaired with 2-0 Vicryl.   The patient tolerated the procedure well and remained in the LDR for recovery. All counts correct.        Infant     Findings: male  infant     Infant observations: Weight: 3820 g (8 lb 6.8 oz)   Length: 20.5  in  Observations/Comments:        Apgars:  8 @ 1 minute /     9 @ 5 minutes   Infant Name: Roberto     Placenta & Cord         Placenta delivered     Spontaneously intact at        Cord:   3 vessels present.   Nuchal Cord?  no   Cord blood obtained:  yes   Cord gases obtained:   no   Cord gas results: Venous:  No results found for: \"PHCVEN\", \"BECVEN\"    Arterial:  No results found for: \"PHCART\", \"BECART\"     Repair     Episiotomy: None     No    Lacerations: Yes  Laceration Information  Laceration Repaired?   Perineal: 2nd  Yes    Periurethral:       Labial:       Sulcus:     "   Vaginal:       Cervical:         Suture used for repair: 2-0 Vicryl     Estimated Blood Loss:       Quantitative Blood Loss:     200mL     Complications     none    Disposition     Mother to Mother Baby/Postpartum  in stable condition currently.  Baby to remains with mom  in stable condition currently.    Barbara Sigala MD  23  08:59 EDT          Electronically signed by Barbara Sigala MD at 23 0900       Manny Mg MD at 23 [phone]          28 y.o.  OB History          2    Para   1    Term   1            AB        Living   1         SAB        IAB        Ectopic        Molar        Multiple   0    Live Births   1             Presents at 41 1/7 weeks as an induction of labour due to post dates  Her primary OB requests a Burnett Bulb placement to initiate the induction of labour.    Fetal Heart Rate Assessment   Method: Fetal HR Assessment Method: external   Beats/min: Fetal HR (beats/min): 120   Baseline: Fetal HR Baseline: normal range   Varibility: Fetal HR Variability: moderate (amplitude range 6 to 25 bpm)   Accels: Fetal HR Accelerations: greater than/equal to 15 bpm, lasting at least 15 seconds   Decels: Fetal HR Decelerations: variable   Tracing Category:       TOCO:  None   SVE:  1/70/-2    A Burnett Bulb was placed without difficulties with 60 cc of sterile saline.  The patient tolerated the procedure well.    Electronically signed by Manny Mg MD at 23 [phone]       Physician Progress Notes (last 24 hours)  Notes from 23 1126 through 23 112   No notes of this type exist for this encounter.

## 2023-11-03 NOTE — H&P
[facility]  Obstetric History and Physical    CC: IOL    SUBJECTIVE:     Patient is a 28 y.o. female  currently at 41w2d, who presents with uncomplicated pregnancy for IOL. Had FB overnight. ROM occurred with placement. Is uncomfortable      Prenatal Information:  Prenatal Results       Initial Prenatal Labs       Test Value Reference Range Date Time    Hemoglobin  11.6 g/dL 12.0 - 15.9 23 1033    Hematocrit  34.4 % 34.0 - 46.6 23 1033    Platelets  213 10*3/mm3 140 - 450 23 1033    Rubella IgG  2.10 index Immune >0.99 23 1033    Hepatitis B SAg  Non-Reactive  Non-Reactive 23 1033    Hepatitis C Ab  Non-Reactive  Non-Reactive 23 1033    RPR  Non-Reactive  Non-Reactive 23 1033    T. Pallidum Ab         ABO  A   23    Rh  Positive   23    Antibody Screen        HIV  Non-Reactive  Non-Reactive 23 1033    Urine Culture  No growth   10/05/23 1106       No growth   23 1033    Gonorrhea  Negative  Negative 23 1033    Chlamydia  Negative  Negative 23 1033    TSH  0.666 uIU/mL 0.270 - 4.200 21 1516    HgB A1c         Varicella IgG        HgB Electrophoresis         Cystic fibrosis                   Fetal testing        Test Value Reference Range Date Time    NIPT        MSAFP        AFP-4                  2nd and 3rd Trimester       Test Value Reference Range Date Time    Hemoglobin (repeated)  9.6 g/dL 12.0 - 15.9 23       10.0 g/dL 12.0 - 15.9 23 1830    Hematocrit (repeated)  28.6 % 34.0 - 46.6 23       29.4 % 34.0 - 46.6 23 1830    Platelets   213 10*3/mm3 140 - 450 238       189 10*3/mm3 140 - 450 23 1830       213 10*3/mm3 140 - 450 23 1033    GCT  82 mg/dL 65 - 139 23 1632    Antibody Screen (repeated)  Negative   23    GTT Fasting        GTT 1 Hr        GTT 2 Hr        GTT 3 Hr        Group B Strep ^ Negative   10/05/23               Other  testing        Test Value Reference Range Date Time    Parvo IgG         CMV IgG                   Drug Screening       Test Value Reference Range Date Time    Amphetamine Screen  Negative  Negative 03/27/23 1033    Barbiturate Screen  Negative  Negative 03/27/23 1033    Benzodiazepine Screen  Negative  Negative 03/27/23 1033    Methadone Screen  Negative  Negative 03/27/23 1033    Phencyclidine Screen  Negative  Negative 03/27/23 1033    Opiates Screen  Negative  Negative 03/27/23 1033    THC Screen  Negative  Negative 03/27/23 1033    Cocaine Screen  Negative  Negative 03/27/23 1033    Propoxyphene Screen  Negative  Negative 03/27/23 1033    Buprenorphine Screen  Negative  Negative 03/27/23 1033    Methamphetamine Screen  Negative  Negative 03/27/23 1033    Oxycodone Screen  Negative  Negative 03/27/23 1033    Tricyclic Antidepressants Screen  Negative  Negative 03/27/23 1033              Legend    ^: Historical                          External Prenatal Results       Pregnancy Outside Results - Transcribed From Office Records - See Scanned Records For Details       Test Value Date Time    ABO  A  11/02/23 2118    Rh  Positive  11/02/23 2118    Antibody Screen  Negative  11/02/23 2118    Varicella IgG       Rubella  2.10 index 03/27/23 1033    Hgb  9.6 g/dL 11/02/23 2118       10.0 g/dL 07/11/23 1830       11.6 g/dL 03/27/23 1033    Hct  28.6 % 11/02/23 2118       29.4 % 07/11/23 1830       34.4 % 03/27/23 1033    Glucose Fasting GTT       Glucose Tolerance Test 1 hour       Glucose Tolerance Test 3 hour       Gonorrhea (discrete)  Negative  03/27/23 1033    Chlamydia (discrete)  Negative  03/27/23 1033    RPR  Non-Reactive  03/27/23 1033    VDRL       Syphilis Antibody       HBsAg  Non-Reactive  03/27/23 1033    Herpes Simplex Virus PCR       Herpes Simplex VIrus Culture       HIV  Non-Reactive  03/27/23 1033    Hep C RNA Quant PCR       Hep C Antibody  Non-Reactive  03/27/23 1033    AFP       Group B Strep ^  Negative  10/05/23     GBS Susceptibility to Clindamycin       GBS Susceptibility to Erythromycin       Fetal Fibronectin       Genetic Testing, Maternal Blood                 Drug Screening       Test Value Date Time    Urine Drug Screen       Amphetamine Screen  Negative  23 1033    Barbiturate Screen  Negative  23 1033    Benzodiazepine Screen  Negative  23 1033    Methadone Screen  Negative  23 1033    Phencyclidine Screen  Negative  23 1033    Opiates Screen  Negative  23 1033    THC Screen  Negative  23 1033    Cocaine Screen       Propoxyphene Screen  Negative  23 1033    Buprenorphine Screen  Negative  23 1033    Methamphetamine Screen       Oxycodone Screen  Negative  23 1033    Tricyclic Antidepressants Screen  Negative  23 1033              Legend    ^: Historical                             OB History:                     OB History    Para Term  AB Living   2 1 1 0 0 1   SAB IAB Ectopic Molar Multiple Live Births   0 0 0 0 0 1      # Outcome Date GA Lbr Jose De Jesus/2nd Weight Sex Delivery Anes PTL Lv   2 Current            1 Term 21 40w2d / 01:50 3430 g (7 lb 9 oz) M Vag-Spont EPI N LAST      Name: YOAN LOPEZ      Apgar1: 8  Apgar5: 9      Allergies:                        Allergies   Allergen Reactions    Sulfa Antibiotics Hives      Past Medical History: Past Medical History:   Diagnosis Date    Anemia 2020    Anxiety     Disease of thyroid gland 2018    Pre Hashimoto's, nodules    Fibroid 2019    Fibroadanoma-left breast    Urinary tract infection       Past Surgical History Past Surgical History:   Procedure Laterality Date    BREAST BIOPSY  2019    Benign fibroadanoma    CLAVICLE SURGERY      AS A CHILD    EYE SURGERY Left     AS A CHILD    URETEROSCOPY LASER LITHOTRIPSY WITH STENT INSERTION Right 2022    Procedure: CYSTOSCOPY, Right retrograde pyelograms  URETEROSCOPY LASER LITHOTRIPSY WITH STENT placement;   Surgeon: John Crowder MD;  Location: [facility] [place];  Service: Urology;  Laterality: Right;    WISDOM TOOTH EXTRACTION  2017      Family History: Family History   Problem Relation Age of Onset    Hashimoto's thyroiditis Mother       Social History:  reports that she has never smoked. She has never used smokeless tobacco.   reports no history of alcohol use.   reports no history of drug use.    General ROS: Pertinent items are noted in HPI, all other systems reviewed and negative   Medications: docusate sodium, fluticasone, hydrOXYzine, oxyCODONE, phenazopyridine, prenatal vitamin 27-0.8, sertraline, and tamsulosin       Objective       Vital Signs Range for the last 24 hours  Temperature: Temp:  [97.5 °F (36.4 °C)-97.9 °F (36.6 °C)] 97.9 °F (36.6 °C)   BP: BP: (107-130)/(57-86) 112/60   Pulse: Heart Rate:  [66-95] 74   Respirations: Resp:  [18] 18   SPO2: SpO2:  [98 %] 98 %               Physical Examination: General appearance - alert, well appearing, and in no distress  Chest - clear to auscultation, no wheezes, rales or rhonchi, symmetric air entry  Heart - normal rate, regular rhythm, normal S1, S2, no murmurs, rubs, clicks or gallops  Abdomen - Soft, gravid, nontender  Extremities - no pedal edema noted      Presentation: VTX   Cervix: MD   Dilation: 9   Effacement: 100   Station: 0       Fetal Heart Rate Assessment           Baseline: Fetal HR Baseline: normal range   Variability: Fetal HR Variability: moderate (amplitude range 6 to 25 bpm)   Accels: Fetal HR Accelerations: greater than/equal to 15 bpm, lasting at least 15 seconds   Decels: Fetal HR Decelerations: absent   Tracing Category:       Uterine Assessment   Method: Method: external tocotransducer   Frequency (min): Contraction Frequency (Minutes): 3-4   Ctx Count in 10 min:       Laboratory Results:  WBC   Date Value Ref Range Status   11/02/2023 7.29 3.40 - 10.80 10*3/mm3 Final     RBC   Date Value Ref Range Status   11/02/2023 3.41 (L) 3.77 -  5.28 10*6/mm3 Final     Hemoglobin   Date Value Ref Range Status   11/02/2023 9.6 (L) 12.0 - 15.9 g/dL Final     Hematocrit   Date Value Ref Range Status   11/02/2023 28.6 (L) 34.0 - 46.6 % Final     MCV   Date Value Ref Range Status   11/02/2023 83.9 79.0 - 97.0 fL Final     MCH   Date Value Ref Range Status   11/02/2023 28.2 26.6 - 33.0 pg Final     MCHC   Date Value Ref Range Status   11/02/2023 33.6 31.5 - 35.7 g/dL Final     RDW   Date Value Ref Range Status   11/02/2023 12.8 12.3 - 15.4 % Final     RDW-SD   Date Value Ref Range Status   11/02/2023 38.7 37.0 - 54.0 fl Final     MPV   Date Value Ref Range Status   11/02/2023 10.3 6.0 - 12.0 fL Final     Platelets   Date Value Ref Range Status   11/02/2023 213 140 - 450 10*3/mm3 Final             Assessment/Plan:   IUP 41w2d here for IOL    1.cont IOL: has progressed well  2.GBS neg  3.FWB reassuring      Barbara Sigala MD  11/3/2023  07:49 EDT

## 2023-11-03 NOTE — ANESTHESIA PREPROCEDURE EVALUATION
Anesthesia Evaluation     Patient summary reviewed and Nursing notes reviewed                Airway   Mallampati: II  TM distance: >3 FB  Neck ROM: full  No difficulty expected  Dental      Pulmonary - negative pulmonary ROS   Cardiovascular - negative cardio ROS    (+) valvular problems/murmurs MVP      Neuro/Psych- negative ROS  (+) psychiatric history Anxiety  GI/Hepatic/Renal/Endo - negative ROS   (+) renal disease- stones    Musculoskeletal (-) negative ROS    Abdominal    Substance History - negative use     OB/GYN negative ob/gyn ROS   (+) Pregnant        Other                    Anesthesia Plan    ASA 2     epidural       Anesthetic plan, risks, benefits, and alternatives have been provided, discussed and informed consent has been obtained with: patient.    Use of blood products discussed with patient .      CODE STATUS:    Level Of Support Discussed With: Patient  Code Status (Patient has no pulse and is not breathing): CPR (Attempt to Resuscitate)  Medical Interventions (Patient has pulse or is breathing): Full Support

## 2023-11-03 NOTE — PROCEDURES
28 y.o.  OB History          2    Para   1    Term   1            AB        Living   1         SAB        IAB        Ectopic        Molar        Multiple   0    Live Births   1             Presents at 41 1/7 weeks as an induction of labour due to post dates  Her primary OB requests a Burnett Bulb placement to initiate the induction of labour.    Fetal Heart Rate Assessment   Method: Fetal HR Assessment Method: external   Beats/min: Fetal HR (beats/min): 120   Baseline: Fetal HR Baseline: normal range   Varibility: Fetal HR Variability: moderate (amplitude range 6 to 25 bpm)   Accels: Fetal HR Accelerations: greater than/equal to 15 bpm, lasting at least 15 seconds   Decels: Fetal HR Decelerations: variable   Tracing Category:       TOCO:  None   SVE:  /-2    A Burnett Bulb was placed without difficulties with 60 cc of sterile saline.  The patient tolerated the procedure well.

## 2023-11-03 NOTE — L&D DELIVERY NOTE
" [facility]   Vaginal Delivery Note    Patient Name: Veronika Hall  : 1995  MRN: [id number]    Date of Delivery: 11/3/2023     Diagnosis     Pre & Post-Delivery:  Intrauterine pregnancy at 41w2d  Labor status: Induced Onset of Labor     Term pregnancy             Problem List    Transfer to Postpartum     Review the Delivery Report for details.     Delivery     Delivery:   Spontaneous, Vaginal   YOB: 2024    Time of Birth:  Gestational Age 8:42 AM   41w2d     Anesthesia:   Epidural   Delivering clinician:  Barbara Sigala MD   Forceps?   No   Vacuum? No    Shoulder dystocia present: No        Delivery narrative:  The patient progressed to complete and delivered a VMI  with Apagrs  the weight is  8#7 via  with epidural anesthesia. Shoulders were delivered easily. The cord was clamped and cut after 60 second delay and the infant was placed on the mother's chest for skin- to - skin. Cord blood was obtained and the placenta was delivered spontaneously intact. 30 units of IV Pitocin was started. Uterine tone was appropriate.   Second degree laceration noted  and repaired with 2-0 Vicryl.   The patient tolerated the procedure well and remained in the LDR for recovery. All counts correct.        Infant     Findings: male  infant     Infant observations: Weight: 3820 g (8 lb 6.8 oz)   Length: 20.5  in  Observations/Comments:        Apgars:  8 @ 1 minute /     9 @ 5 minutes   Infant Name: Roberto     Placenta & Cord         Placenta delivered     Spontaneously intact at        Cord:   3 vessels present.   Nuchal Cord?  no   Cord blood obtained:  yes   Cord gases obtained:   no   Cord gas results: Venous:  No results found for: \"PHCVEN\", \"BECVEN\"    Arterial:  No results found for: \"PHCART\", \"BECART\"     Repair     Episiotomy: None     No    Lacerations: Yes  Laceration Information  Laceration Repaired?   Perineal: 2nd  Yes    Periurethral:       Labial:       Sulcus:       Vaginal:     "   Cervical:         Suture used for repair: 2-0 Vicryl     Estimated Blood Loss:       Quantitative Blood Loss:     200mL     Complications     none    Disposition     Mother to Mother Baby/Postpartum  in stable condition currently.  Baby to remains with mom  in stable condition currently.    Barbara Sigala MD  11/03/23  08:59 EDT

## 2023-11-03 NOTE — LACTATION NOTE
11/03/23 1215   Maternal Information   Date of Referral 11/03/23   Person Making Referral lactation consultant  (courtesy consult)   Maternal Reason for Referral   ( 1st baby x 14 months)   Infant Reason for Referral   (reports baby  for 30 minutes per side in labor and delivery)   Milk Expression/Equipment   Breast Pump Type double electric, personal   Breast Pumping   Breast Pumping Interventions   (pump for missed feedings;  gave syringes and discussed pulling up small volumes of colostrum and syringe/finger feeding to baby)     Teaching done and documented under Education. To call lactation services for latch check, or if there are questions or concerns.

## 2023-11-04 LAB
BASOPHILS # BLD AUTO: 0.03 10*3/MM3 (ref 0–0.2)
BASOPHILS NFR BLD AUTO: 0.3 % (ref 0–1.5)
DEPRECATED RDW RBC AUTO: 39.8 FL (ref 37–54)
EOSINOPHIL # BLD AUTO: 0.12 10*3/MM3 (ref 0–0.4)
EOSINOPHIL NFR BLD AUTO: 1.3 % (ref 0.3–6.2)
ERYTHROCYTE [DISTWIDTH] IN BLOOD BY AUTOMATED COUNT: 12.9 % (ref 12.3–15.4)
HCT VFR BLD AUTO: 23.1 % (ref 34–46.6)
HGB BLD-MCNC: 7.4 G/DL (ref 12–15.9)
IMM GRANULOCYTES # BLD AUTO: 0.09 10*3/MM3 (ref 0–0.05)
IMM GRANULOCYTES NFR BLD AUTO: 1 % (ref 0–0.5)
LYMPHOCYTES # BLD AUTO: 2.06 10*3/MM3 (ref 0.7–3.1)
LYMPHOCYTES NFR BLD AUTO: 22.5 % (ref 19.6–45.3)
MCH RBC QN AUTO: 27.3 PG (ref 26.6–33)
MCHC RBC AUTO-ENTMCNC: 32 G/DL (ref 31.5–35.7)
MCV RBC AUTO: 85.2 FL (ref 79–97)
MONOCYTES # BLD AUTO: 0.68 10*3/MM3 (ref 0.1–0.9)
MONOCYTES NFR BLD AUTO: 7.4 % (ref 5–12)
NEUTROPHILS NFR BLD AUTO: 6.16 10*3/MM3 (ref 1.7–7)
NEUTROPHILS NFR BLD AUTO: 67.5 % (ref 42.7–76)
NRBC BLD AUTO-RTO: 0 /100 WBC (ref 0–0.2)
PLATELET # BLD AUTO: 159 10*3/MM3 (ref 140–450)
PMV BLD AUTO: 10.5 FL (ref 6–12)
RBC # BLD AUTO: 2.71 10*6/MM3 (ref 3.77–5.28)
WBC NRBC COR # BLD: 9.14 10*3/MM3 (ref 3.4–10.8)

## 2023-11-04 PROCEDURE — 85025 COMPLETE CBC W/AUTO DIFF WBC: CPT | Performed by: OBSTETRICS & GYNECOLOGY

## 2023-11-04 RX ORDER — FERROUS SULFATE 325(65) MG
325 TABLET ORAL 2 TIMES DAILY WITH MEALS
Status: DISCONTINUED | OUTPATIENT
Start: 2023-11-04 | End: 2023-11-05 | Stop reason: HOSPADM

## 2023-11-04 RX ADMIN — PRENATAL VITAMINS-IRON FUMARATE 27 MG IRON-FOLIC ACID 0.8 MG TABLET 1 TABLET: at 08:28

## 2023-11-04 RX ADMIN — DOCUSATE SODIUM 100 MG: 100 CAPSULE, LIQUID FILLED ORAL at 08:28

## 2023-11-04 RX ADMIN — ACETAMINOPHEN 650 MG: 325 TABLET ORAL at 00:33

## 2023-11-04 RX ADMIN — FERROUS SULFATE TAB 325 MG (65 MG ELEMENTAL FE) 325 MG: 325 (65 FE) TAB at 12:15

## 2023-11-04 RX ADMIN — IBUPROFEN 600 MG: 600 TABLET ORAL at 12:15

## 2023-11-04 RX ADMIN — IBUPROFEN 600 MG: 600 TABLET ORAL at 18:12

## 2023-11-04 RX ADMIN — ACETAMINOPHEN 650 MG: 325 TABLET ORAL at 08:28

## 2023-11-04 RX ADMIN — IBUPROFEN 600 MG: 600 TABLET ORAL at 06:32

## 2023-11-04 RX ADMIN — FERROUS SULFATE TAB 325 MG (65 MG ELEMENTAL FE) 325 MG: 325 (65 FE) TAB at 17:49

## 2023-11-04 NOTE — PROGRESS NOTES
11/4/2023  PPD #1    Subjective   Veronika feels well.  Patient describes her lochia as less than menses.  Pain is well controlled  She is breastfeeding.      Objective   Temp: Temp:  [97.6 °F (36.4 °C)-98.3 °F (36.8 °C)] 97.6 °F (36.4 °C) Temp src: Oral   BP: BP: (105-124)/(53-79) 124/57        Pulse: Heart Rate:  [71-84] 71  RR: Resp:  [16] 16    General:  No acute distress   Abdomen: Fundus firm and beneath umbilicus   Pelvis: deferred     Lab Results   Component Value Date    WBC 9.14 11/04/2023    HGB 7.4 (L) 11/04/2023    HCT 23.1 (L) 11/04/2023    MCV 85.2 11/04/2023     11/04/2023    CREATININE 0.76 02/23/2022    AST 12 02/22/2022    ALT 9 02/22/2022    HEPBSAG Non-Reactive 03/27/2023     Results from last 7 days   Lab Units 11/02/23 2118   ABO TYPING  A   RH TYPING  Positive   ANTIBODY SCREEN  Negative       Assessment  PPD# 1 after vaginal delivery  2.   Anemia, POA    Plan  Supportive care, anticipate d/c in am.  2.   Continue ferrous sulfate 325mg PO BID.     This note has been electronically signed.    Iris Hyman CNM  09:25 EDT  November 4, 2023

## 2023-11-05 VITALS
BODY MASS INDEX: 26.36 KG/M2 | HEART RATE: 70 BPM | SYSTOLIC BLOOD PRESSURE: 114 MMHG | DIASTOLIC BLOOD PRESSURE: 64 MMHG | OXYGEN SATURATION: 98 % | HEIGHT: 69 IN | WEIGHT: 178 LBS | RESPIRATION RATE: 16 BRPM | TEMPERATURE: 98.3 F

## 2023-11-05 PROBLEM — Z34.90 TERM PREGNANCY: Status: RESOLVED | Noted: 2023-11-02 | Resolved: 2023-11-05

## 2023-11-05 RX ORDER — IBUPROFEN 600 MG/1
600 TABLET ORAL EVERY 6 HOURS PRN
Qty: 60 TABLET | Refills: 1 | Status: SHIPPED | OUTPATIENT
Start: 2023-11-05

## 2023-11-05 RX ORDER — DOCUSATE SODIUM 100 MG/1
100 CAPSULE, LIQUID FILLED ORAL 2 TIMES DAILY PRN
Qty: 60 CAPSULE | Refills: 1 | Status: SHIPPED | OUTPATIENT
Start: 2023-11-05

## 2023-11-05 RX ORDER — FERROUS SULFATE 325(65) MG
325 TABLET ORAL 2 TIMES DAILY WITH MEALS
Qty: 60 TABLET | Refills: 1 | Status: SHIPPED | OUTPATIENT
Start: 2023-11-05

## 2023-11-05 RX ADMIN — DOCUSATE SODIUM 100 MG: 100 CAPSULE, LIQUID FILLED ORAL at 08:35

## 2023-11-05 RX ADMIN — IBUPROFEN 600 MG: 600 TABLET ORAL at 00:00

## 2023-11-05 RX ADMIN — FERROUS SULFATE TAB 325 MG (65 MG ELEMENTAL FE) 325 MG: 325 (65 FE) TAB at 08:35

## 2023-11-05 RX ADMIN — IBUPROFEN 600 MG: 600 TABLET ORAL at 06:29

## 2023-11-05 RX ADMIN — PRENATAL VITAMINS-IRON FUMARATE 27 MG IRON-FOLIC ACID 0.8 MG TABLET 1 TABLET: at 08:35

## 2023-11-05 NOTE — DISCHARGE SUMMARY
[facility]  Vaginal delivery discharge summary      Patient: Veronika Hall      MR#:[id number]  Admission  Diagnosis:    (spontaneous vaginal delivery)    Postpartum anemia     Discharge Diagnosis:   1.  2.  (spontaneous vaginal delivery)  Postpartum anemia             Date of Admission: 2023  Date of Discharge:  2023    Procedures:  Vaginal, Spontaneous     11/3/2023    8:42 AM      Service:  Obstetrics    Hospital Course:  Patient underwent vaginal delivery and remained in the hospital for 3 days.  During that time she remained afebrile and hemodynamically stable.  On the day of discharge, she was eating, ambulating and voiding without difficulty.  She is breastfeeding.     Labs:    Lab Results   Component Value Date    WBC 9.14 2023    HGB 7.4 (L) 2023    HCT 23.1 (L) 2023    MCV 85.2 2023     2023    CREATININE 0.76 2022    AST 12 2022    ALT 9 2022     Results from last 7 days   Lab Units 23   ABO TYPING  A   RH TYPING  Positive   ANTIBODY SCREEN  Negative       Discharge Medications     Discharge Medications        New Medications        Instructions Start Date   ferrous sulfate 325 (65 FE) MG tablet   325 mg, Oral, 2 Times Daily With Meals      ibuprofen 600 MG tablet  Commonly known as: ADVIL,MOTRIN   600 mg, Oral, Every 6 Hours PRN             Changes to Medications        Instructions Start Date   docusate sodium 100 MG capsule  Commonly known as: Colace  What changed:   when to take this  reasons to take this   100 mg, Oral, 2 Times Daily PRN             Continue These Medications        Instructions Start Date   fluticasone 50 MCG/ACT nasal spray  Commonly known as: FLONASE   2 sprays, Nasal, Daily      prenatal vitamin 27-0.8 27-0.8 MG tablet tablet   Oral, Daily      sertraline 50 MG tablet  Commonly known as: ZOLOFT   50 mg, Oral, Daily             Stop These Medications      hydrOXYzine 25 MG tablet  Commonly  known as: ATARAX     oxyCODONE 5 MG immediate release tablet  Commonly known as: Roxicodone     phenazopyridine 100 MG tablet  Commonly known as: PYRIDIUM     tamsulosin 0.4 MG capsule 24 hr capsule  Commonly known as: FLOMAX              Discharge Disposition:  To Home    Discharge Condition:  Stable. PP anemia continuing ferrous sulfate.     Discharge Diet: Regular    Activity at Discharge: Pelvic rest    Follow-up Appointments  Follow up with LW in 6 weeks.     Iris Hyman CNM  11/05/23  08:33 EST

## 2023-11-05 NOTE — PLAN OF CARE
Goal Outcome Evaluation:  Plan of Care Reviewed With: patient, spouse        Progress: improving   D/C home today. Pt providing all self care adequately. Pain well controlled with motrin.

## 2023-11-05 NOTE — PAYOR COMM NOTE
"Veronika Hall (28 y.o. Female)       Date of Birth   1995    Social Security Number       Address   [address] [place], [place] [address]    Home Phone   [phone]    MRN   [id number]       Oriental orthodox   None    Marital Status                               Admission Date   11/2/23    Admission Type   Elective    Admitting Provider   Barbara Sigala MD    Attending Provider       Department, Room/Bed   [facility] MOTHER BABY 4A, N403/1       Discharge Date   11/5/2023    Discharge Disposition   Home or Self Care    Discharge Destination                                 Attending Provider: (none)   Allergies: Sulfa Antibiotics    Isolation: None   Infection: None   Code Status: Prior    Ht: 175.3 cm (69\")   Wt: 80.7 kg (178 lb)    Admission Cmt: None   Principal Problem: Term pregnancy [Z34.90]                   Active Insurance as of 11/2/2023       Primary Coverage       Payor Plan Insurance Group Employer/Plan Group    [facility]        Payor Plan Address Payor Plan Phone Number Payor Plan Fax Number Effective Dates    [address] [phone]  3/16/2021 - None Entered    [facility] [id number]         Subscriber Name Subscriber Birth Date Member ID       VERONIKA HALL 1995 [id number]                     Emergency Contacts        (Rel.) Home Phone Work Phone Mobile Phone    DEONTE NICHOLS (Spouse) [phone] -- [phone]              Insurance Information                  / [facility] Phone: [phone]    Subscriber: Veronika Hall Subscriber#: [id number]    Group#: -- Precert#: --             Discharge Summary        Iris Hyman CNM at 11/05/23 0832       Attestation signed by Canavan, Allison, MD at 11/05/23 0840    I have reviewed this documentation and agree.                  [facility]  Vaginal delivery discharge summary      Patient: Veronika Hall      MR#:[id number]  Admission  Diagnosis:   "  (spontaneous vaginal delivery)    Postpartum anemia     Discharge Diagnosis:   1.  2.  (spontaneous vaginal delivery)  Postpartum anemia             Date of Admission: 2023  Date of Discharge:  2023    Procedures:  Vaginal, Spontaneous     11/3/2023    8:42 AM      Service:  Obstetrics    Hospital Course:  Patient underwent vaginal delivery and remained in the hospital for 3 days.  During that time she remained afebrile and hemodynamically stable.  On the day of discharge, she was eating, ambulating and voiding without difficulty.  She is breastfeeding.     Labs:    Lab Results   Component Value Date    WBC 9.14 2023    HGB 7.4 (L) 2023    HCT 23.1 (L) 2023    MCV 85.2 2023     2023    CREATININE 0.76 2022    AST 12 2022    ALT 9 2022     Results from last 7 days   Lab Units 23   ABO TYPING  A   RH TYPING  Positive   ANTIBODY SCREEN  Negative       Discharge Medications     Discharge Medications        New Medications        Instructions Start Date   ferrous sulfate 325 (65 FE) MG tablet   325 mg, Oral, 2 Times Daily With Meals      ibuprofen 600 MG tablet  Commonly known as: ADVIL,MOTRIN   600 mg, Oral, Every 6 Hours PRN             Changes to Medications        Instructions Start Date   docusate sodium 100 MG capsule  Commonly known as: Colace  What changed:   when to take this  reasons to take this   100 mg, Oral, 2 Times Daily PRN             Continue These Medications        Instructions Start Date   fluticasone 50 MCG/ACT nasal spray  Commonly known as: FLONASE   2 sprays, Nasal, Daily      prenatal vitamin 27-0.8 27-0.8 MG tablet tablet   Oral, Daily      sertraline 50 MG tablet  Commonly known as: ZOLOFT   50 mg, Oral, Daily             Stop These Medications      hydrOXYzine 25 MG tablet  Commonly known as: ATARAX     oxyCODONE 5 MG immediate release tablet  Commonly known as: Roxicodone     phenazopyridine 100 MG  tablet  Commonly known as: PYRIDIUM     tamsulosin 0.4 MG capsule 24 hr capsule  Commonly known as: FLOMAX              Discharge Disposition:  To Home    Discharge Condition:  Stable. PP anemia continuing ferrous sulfate.     Discharge Diet: Regular    Activity at Discharge: Pelvic rest    Follow-up Appointments  Follow up with LW in 6 weeks.     Iris Hyman CNM  11/05/23  08:33 EST    Electronically signed by Canavan, Allison, MD at 11/05/23 0801

## 2024-11-18 ENCOUNTER — OFFICE VISIT (OUTPATIENT)
Dept: INTERNAL MEDICINE | Facility: CLINIC | Age: 29
End: 2024-11-18
Payer: OTHER GOVERNMENT

## 2024-11-18 VITALS
BODY MASS INDEX: 18.81 KG/M2 | WEIGHT: 127 LBS | DIASTOLIC BLOOD PRESSURE: 78 MMHG | HEART RATE: 70 BPM | TEMPERATURE: 99.1 F | HEIGHT: 69 IN | OXYGEN SATURATION: 98 % | SYSTOLIC BLOOD PRESSURE: 110 MMHG

## 2024-11-18 DIAGNOSIS — E04.1 THYROID NODULE: ICD-10-CM

## 2024-11-18 DIAGNOSIS — Z76.89 ENCOUNTER TO ESTABLISH CARE: Primary | ICD-10-CM

## 2024-11-18 DIAGNOSIS — Z86.39 HISTORY OF IRON DEFICIENCY: ICD-10-CM

## 2024-11-18 DIAGNOSIS — Z13.228 SCREENING FOR ENDOCRINE, METABOLIC AND IMMUNITY DISORDER: ICD-10-CM

## 2024-11-18 DIAGNOSIS — Z13.29 SCREENING FOR ENDOCRINE, METABOLIC AND IMMUNITY DISORDER: ICD-10-CM

## 2024-11-18 DIAGNOSIS — L70.9 ACNE, UNSPECIFIED ACNE TYPE: ICD-10-CM

## 2024-11-18 DIAGNOSIS — Z13.0 SCREENING FOR ENDOCRINE, METABOLIC AND IMMUNITY DISORDER: ICD-10-CM

## 2024-11-18 NOTE — PROGRESS NOTES
Date: 2024    Name: Veronika Hall  : 1995    Chief Complaint:   Chief Complaint   Patient presents with    [facility]     Thyroid issue     History of Present Illness  Veronika Hall is a 29 y.o. female presents to [facility].      She has a family history of thyroid cancer, with both her mother and grandmother having had the disease. Four years ago, while living in [place], she had an ultrasound that revealed pre-Hashimoto's hypoechoic spots on her thyroid. After the birth of her child, she felt unwell, which she initially attributed to postpartum changes. Despite normal blood work, she wishes to have another ultrasound due to the presence of nodules. She reports feeling irritable and tired, and has experienced significant weight loss, dropping from 180 pounds to 125-127 pounds. She has not experienced any palpitations or heart racing. She has a history of anemia and has noticed hair loss, but is unsure if this is related to her thyroid condition or postpartum changes. She reports no difficulty swallowing or changes in her voice.    She has been dealing with acne, which is a new development for her. She has considered seeing a dermatologist, but was informed she would need a referral. The acne is primarily located on her neck and occasionally appears on her back, but not on her chest.    She has been experiencing a cough with significant phlegm production. She has not taken any over-the-counter medications for this symptom.    She was prescribed sertraline and took it through pregnancy and has taken it on and off since her son was born. She has significant anxiety but does not feel like it is postpartum depression because she still finds enjoyment in things. She stays home with her children and her  works a lot. She feels like something is physically off. She has always struggled with an upset stomach and almost immediately has diarrhea when she has anxious thoughts. She has  had symptoms consistent with IBS for a long time. She has been to counseling for anxiety before and it helped.    She had a nodule on her breast that was biopsied and it was benign. It has gone away since she stopped hormonal birth control. She had a kidney stone postpartum and had it removed. She has not seen a doctor for a long time for anything other than pregnancy. She is no longer nursing. She sleeps well.    Her periods are regular now and have been that way for 4 or 5 months. She started having periods at the age of 13 or 14 and was on hormonal birth control immediately because of heavy flow. She was not sexually active until she got . She has not been on birth control for 4 years. She is not trying to get pregnant now and uses the pullout method and tracks her ovulation. She is not taking prenatals, vitamins, or any other medication.    FAMILY HISTORY  Her mother and grandmother both had thyroid cancer. Her mother has bipolar disorder. Her father has kidney problems and he was born with hydronephrosis of the kidney.        History:    Past Medical History:   Diagnosis Date    Anemia 2020    Anxiety     Disease of thyroid gland 2018    Pre Hashimoto's, nodules    Fibroid 2019    Fibroadanoma-left breast    Urinary tract infection        Past Surgical History:   Procedure Laterality Date    BREAST BIOPSY  2019    Benign fibroadanoma    CLAVICLE SURGERY      AS A CHILD    EYE SURGERY Left     AS A CHILD    URETEROSCOPY LASER LITHOTRIPSY WITH STENT INSERTION Right 2/23/2022    Procedure: CYSTOSCOPY, Right retrograde pyelograms  URETEROSCOPY LASER LITHOTRIPSY WITH STENT placement;  Surgeon: John Crowder MD;  Location: [facility];  Service: Urology;  Laterality: Right;    WISDOM TOOTH EXTRACTION  2017       Family History   Problem Relation Age of Onset    Hashimoto's thyroiditis Mother        Social History     Socioeconomic History    Marital status:     Number of children: 0    Years of  "education: 4    Highest education level: 12th grade   Tobacco Use    Smoking status: Never    Smokeless tobacco: Never   Vaping Use    Vaping status: Never Used   Substance and Sexual Activity    Alcohol use: Never    Drug use: Never    Sexual activity: Yes     Partners: Male     Birth control/protection: Condom       Allergies   Allergen Reactions    Sulfa Antibiotics Hives         Current Outpatient Medications:     docusate sodium (Colace) 100 MG capsule, Take 1 capsule by mouth 2 (Two) Times a Day As Needed for Constipation., Disp: 60 capsule, Rfl: 1    ferrous sulfate 325 (65 FE) MG tablet, Take 1 tablet by mouth 2 (Two) Times a Day With Meals., Disp: 60 tablet, Rfl: 1    fluticasone (FLONASE) 50 MCG/ACT nasal spray, 2 sprays into the nostril(s) as directed by provider Daily for 7 days., Disp: 9.9 mL, Rfl: 0    ibuprofen (ADVIL,MOTRIN) 600 MG tablet, Take 1 tablet by mouth Every 6 (Six) Hours As Needed for Mild Pain (First Line: Mild pain.)., Disp: 60 tablet, Rfl: 1    sertraline (ZOLOFT) 50 MG tablet, Take 1 tablet by mouth Daily., Disp: 30 tablet, Rfl: 6    ROS:  Review of Systems    VS:  Vitals:    11/18/24 1121   BP: 110/78   Pulse: 70   Temp: 99.1 °F (37.3 °C)   SpO2: 98%   Weight: 57.6 kg (127 lb)   Height: 175.3 cm (69.02\")     Body mass index is 18.75 kg/m².  BMI is within normal parameters. No other follow-up for BMI required.       PE:  Physical Exam  Constitutional:       Appearance: She is not ill-appearing.   HENT:      Head: Normocephalic.      Right Ear: External ear normal.      Left Ear: External ear normal.   Eyes:      Conjunctiva/sclera: Conjunctivae normal.      Pupils: Pupils are equal, round, and reactive to light.   Cardiovascular:      Rate and Rhythm: Normal rate and regular rhythm.      Pulses:           Radial pulses are 2+ on the right side and 2+ on the left side.        Dorsalis pedis pulses are 2+ on the right side and 2+ on the left side.      Heart sounds: Normal heart sounds. "   Pulmonary:      Effort: Pulmonary effort is normal.      Breath sounds: Normal breath sounds.   Musculoskeletal:      Cervical back: Normal range of motion and neck supple.      Right lower leg: No edema.      Left lower leg: No edema.   Skin:     General: Skin is warm.      Capillary Refill: Capillary refill takes less than 2 seconds.      Comments: Acne scattered on face   Neurological:      Mental Status: She is alert and oriented to person, place, and time.      Coordination: Coordination normal.      Gait: Gait normal.   Psychiatric:         Mood and Affect: Mood normal.         Behavior: Behavior normal.         Thought Content: Thought content normal.       Assessment/Plan:     Diagnoses and all orders for this visit:    1. Encounter to establish care (Primary)    2. Acne, unspecified acne type  -     Ambulatory Referral to Dermatology    3. Thyroid nodule  -     T4, Free  -     TSH  -     Triiodothyronine (T3) Total & Free  -     US thyroid; Future  -     Thyroid Antibodies    4. History of iron deficiency  -     CBC & Differential  -     Iron and TIBC  -     Vitamin B12  -     Folate    5. Screening for endocrine, metabolic and immunity disorder  -     Comprehensive Metabolic Panel          Return for Annual.

## 2024-11-19 ENCOUNTER — TELEPHONE (OUTPATIENT)
Dept: INTERNAL MEDICINE | Facility: CLINIC | Age: 29
End: 2024-11-19
Payer: OTHER GOVERNMENT

## 2024-11-19 LAB
ALBUMIN SERPL-MCNC: 4.3 G/DL (ref 3.5–5.2)
ALBUMIN/GLOB SERPL: 1.5 G/DL
ALP SERPL-CCNC: 75 U/L (ref 39–117)
ALT SERPL-CCNC: 9 U/L (ref 1–33)
AST SERPL-CCNC: 13 U/L (ref 1–32)
BASOPHILS # BLD AUTO: 0.04 10*3/MM3 (ref 0–0.2)
BASOPHILS NFR BLD AUTO: 0.6 % (ref 0–1.5)
BILIRUB SERPL-MCNC: 0.2 MG/DL (ref 0–1.2)
BUN SERPL-MCNC: 10 MG/DL (ref 6–20)
BUN/CREAT SERPL: 13.9 (ref 7–25)
CALCIUM SERPL-MCNC: 9 MG/DL (ref 8.6–10.5)
CHLORIDE SERPL-SCNC: 104 MMOL/L (ref 98–107)
CO2 SERPL-SCNC: 23.4 MMOL/L (ref 22–29)
CREAT SERPL-MCNC: 0.72 MG/DL (ref 0.57–1)
EGFRCR SERPLBLD CKD-EPI 2021: 115.5 ML/MIN/1.73
EOSINOPHIL # BLD AUTO: 0.12 10*3/MM3 (ref 0–0.4)
EOSINOPHIL NFR BLD AUTO: 1.9 % (ref 0.3–6.2)
ERYTHROCYTE [DISTWIDTH] IN BLOOD BY AUTOMATED COUNT: 12.7 % (ref 12.3–15.4)
FOLATE SERPL-MCNC: 6.02 NG/ML (ref 4.78–24.2)
GLOBULIN SER CALC-MCNC: 2.8 GM/DL
GLUCOSE SERPL-MCNC: 84 MG/DL (ref 65–99)
HCT VFR BLD AUTO: 37 % (ref 34–46.6)
HGB BLD-MCNC: 12.4 G/DL (ref 12–15.9)
IMM GRANULOCYTES # BLD AUTO: 0.02 10*3/MM3 (ref 0–0.05)
IMM GRANULOCYTES NFR BLD AUTO: 0.3 % (ref 0–0.5)
IRON SATN MFR SERPL: 6 % (ref 20–50)
IRON SERPL-MCNC: 27 MCG/DL (ref 37–145)
LYMPHOCYTES # BLD AUTO: 1.41 10*3/MM3 (ref 0.7–3.1)
LYMPHOCYTES NFR BLD AUTO: 22 % (ref 19.6–45.3)
MCH RBC QN AUTO: 28.3 PG (ref 26.6–33)
MCHC RBC AUTO-ENTMCNC: 33.5 G/DL (ref 31.5–35.7)
MCV RBC AUTO: 84.5 FL (ref 79–97)
MONOCYTES # BLD AUTO: 0.46 10*3/MM3 (ref 0.1–0.9)
MONOCYTES NFR BLD AUTO: 7.2 % (ref 5–12)
NEUTROPHILS # BLD AUTO: 4.36 10*3/MM3 (ref 1.7–7)
NEUTROPHILS NFR BLD AUTO: 68 % (ref 42.7–76)
NRBC BLD AUTO-RTO: 0 /100 WBC (ref 0–0.2)
PLATELET # BLD AUTO: 250 10*3/MM3 (ref 140–450)
POTASSIUM SERPL-SCNC: 3.8 MMOL/L (ref 3.5–5.2)
PROT SERPL-MCNC: 7.1 G/DL (ref 6–8.5)
RBC # BLD AUTO: 4.38 10*6/MM3 (ref 3.77–5.28)
SODIUM SERPL-SCNC: 140 MMOL/L (ref 136–145)
T3 SERPL-MCNC: 87 NG/DL (ref 71–180)
T3FREE SERPL-MCNC: 3 PG/ML (ref 2–4.4)
T4 FREE SERPL-MCNC: 1.22 NG/DL (ref 0.92–1.68)
THYROGLOB AB SERPL-ACNC: <1 IU/ML (ref 0–0.9)
THYROPEROXIDASE AB SERPL-ACNC: 14 IU/ML (ref 0–34)
TIBC SERPL-MCNC: 457 MCG/DL
TSH SERPL DL<=0.005 MIU/L-ACNC: 1.13 UIU/ML (ref 0.27–4.2)
UIBC SERPL-MCNC: 430 MCG/DL (ref 112–346)
VIT B12 SERPL-MCNC: 821 PG/ML (ref 211–946)
WBC # BLD AUTO: 6.41 10*3/MM3 (ref 3.4–10.8)

## 2024-11-19 NOTE — TELEPHONE ENCOUNTER
Caller: Veronika Hall    Relationship: Self    Best call back number:      [phone] (Mobile)     What test was performed: BLOOD WORK     When was the test performed:  11-19-24    Where was the test performed:  [facility] [place]     Additional notes: PLEASE CALL WITH TEST RESULTS     ALSO PATIENT SAID SHE IS WAITING FOR A REFERRAL FOR DERMATOLOGY AND A THYROID ULTRASOUND

## 2024-11-20 NOTE — PROGRESS NOTES
Iron saturation,iron low, which causes UIBC (iron binding capacity) to be elevated.  Consider taking over the counter iron supplement with 65 mg elemental iron daily. Take with a meal, with vitamin C to enhance absorption. Can cause constipation and stomach upset.    CBC is normal, no anemia.   Labs are otherwise normal.  Continue with plan to get ultrasound of thyroid.

## 2024-12-04 ENCOUNTER — HOSPITAL ENCOUNTER (OUTPATIENT)
Facility: HOSPITAL | Age: 29
Discharge: HOME | End: 2024-12-04
Admitting: NURSE PRACTITIONER
Payer: OTHER GOVERNMENT

## 2024-12-04 DIAGNOSIS — E04.1 THYROID NODULE: ICD-10-CM

## 2024-12-04 PROCEDURE — 76536 US EXAM OF HEAD AND NECK: CPT

## 2024-12-06 ENCOUNTER — PATIENT ROUNDING (BHMG ONLY) (OUTPATIENT)
Dept: INTERNAL MEDICINE | Facility: CLINIC | Age: 29
End: 2024-12-06
Payer: OTHER GOVERNMENT

## 2024-12-06 NOTE — PROGRESS NOTES
A [employer] message has been sent to patient for PATIENT ROUNDING with [facility].

## 2024-12-13 DIAGNOSIS — E04.1 THYROID NODULE: Primary | ICD-10-CM

## (undated) DEVICE — ADAPT UROLOK

## (undated) DEVICE — NITINOL WIRE WITH HYDROPHILIC TIP: Brand: SENSOR

## (undated) DEVICE — FIBR LASR HOLMIUM SLIMLINE SIS EZ 365U

## (undated) DEVICE — SLV SCD CALF HEMOFORCE DVT THERP REPROC MD

## (undated) DEVICE — GLV SURG SENSICARE LT W/ALOE PF LF 7 STRL

## (undated) DEVICE — RICH CYSTO: Brand: MEDLINE INDUSTRIES, INC.

## (undated) DEVICE — SOL IRR NACL 0.9PCT 3000ML

## (undated) DEVICE — DUAL LUMEN URETERAL CATHETER

## (undated) DEVICE — ST FLD IRR WARM

## (undated) DEVICE — NITINOL STONE RETRIEVAL BASKET: Brand: ZERO TIP

## (undated) DEVICE — CATH URETRL AP 18F

## (undated) DEVICE — GW AMPLTZ SUPRSTF PTFE JB .035 7X145CM

## (undated) DEVICE — CATH URETRL FLXITP POLLACK STD 5F 70CM

## (undated) DEVICE — UROLOGY TORQUE CATHETER: Brand: IMAGER II

## (undated) DEVICE — GLW STD STR 3CM .035X150CM